# Patient Record
Sex: FEMALE | Race: WHITE | NOT HISPANIC OR LATINO | Employment: FULL TIME | URBAN - NONMETROPOLITAN AREA
[De-identification: names, ages, dates, MRNs, and addresses within clinical notes are randomized per-mention and may not be internally consistent; named-entity substitution may affect disease eponyms.]

---

## 2021-01-08 DIAGNOSIS — I10 ESSENTIAL HYPERTENSION, BENIGN: Primary | ICD-10-CM

## 2021-01-08 RX ORDER — FLUTICASONE PROPIONATE 50 MCG
50 SPRAY, SUSPENSION (ML) NASAL DAILY
COMMUNITY
Start: 2020-10-26

## 2021-01-08 RX ORDER — ROSUVASTATIN CALCIUM 10 MG/1
10 TABLET, COATED ORAL
COMMUNITY
Start: 2020-12-17 | End: 2021-06-11

## 2021-01-08 RX ORDER — AMLODIPINE AND VALSARTAN 5; 320 MG/1; MG/1
1 TABLET ORAL DAILY
COMMUNITY
Start: 2020-12-17 | End: 2021-09-13

## 2021-01-08 NOTE — TELEPHONE ENCOUNTER
Dr. Guillen:     Last Office Visit: 04/27/20  Last Labs: 04/27/2020  Next Lab Visit: N/A  Next Office Visit: 02/25/21

## 2021-01-14 RX ORDER — NEBIVOLOL 10 MG/1
10 TABLET ORAL DAILY
Qty: 90 TABLET | Refills: 3 | Status: SHIPPED | OUTPATIENT
Start: 2021-01-14 | End: 2021-12-17

## 2021-03-22 ENCOUNTER — LAB (OUTPATIENT)
Dept: CARDIOLOGY | Facility: CLINIC | Age: 61
End: 2021-03-22

## 2021-03-26 ENCOUNTER — LAB (OUTPATIENT)
Dept: CARDIOLOGY | Facility: CLINIC | Age: 61
End: 2021-03-26

## 2021-03-30 ENCOUNTER — LAB (OUTPATIENT)
Dept: CARDIOLOGY | Facility: CLINIC | Age: 61
End: 2021-03-30

## 2021-03-30 DIAGNOSIS — E78.2 MIXED HYPERLIPIDEMIA: ICD-10-CM

## 2021-03-30 DIAGNOSIS — I10 ESSENTIAL HYPERTENSION, BENIGN: Primary | ICD-10-CM

## 2021-03-31 LAB
ALBUMIN SERPL-MCNC: 4.5 G/DL (ref 3.8–4.9)
ALBUMIN/GLOB SERPL: 1.7 {RATIO} (ref 1.2–2.2)
ALP SERPL-CCNC: 99 IU/L (ref 39–117)
ALT SERPL-CCNC: 13 IU/L (ref 0–32)
AST SERPL-CCNC: 22 IU/L (ref 0–40)
BASOPHILS # BLD AUTO: 0.1 X10E3/UL (ref 0–0.2)
BASOPHILS NFR BLD AUTO: 1 %
BILIRUB SERPL-MCNC: 0.3 MG/DL (ref 0–1.2)
BUN SERPL-MCNC: 10 MG/DL (ref 6–24)
BUN/CREAT SERPL: 15 (ref 9–23)
CALCIUM SERPL-MCNC: 9.2 MG/DL (ref 8.7–10.2)
CHLORIDE SERPL-SCNC: 104 MMOL/L (ref 96–106)
CHOLEST SERPL-MCNC: 176 MG/DL (ref 100–199)
CK SERPL-CCNC: 79 U/L (ref 32–182)
CO2 SERPL-SCNC: 21 MMOL/L (ref 20–29)
CREAT SERPL-MCNC: 0.68 MG/DL (ref 0.57–1)
EOSINOPHIL # BLD AUTO: 0.3 X10E3/UL (ref 0–0.4)
EOSINOPHIL NFR BLD AUTO: 2 %
ERYTHROCYTE [DISTWIDTH] IN BLOOD BY AUTOMATED COUNT: 13.7 % (ref 11.7–15.4)
GLOBULIN SER CALC-MCNC: 2.7 G/DL (ref 1.5–4.5)
GLUCOSE SERPL-MCNC: 102 MG/DL (ref 65–99)
HCT VFR BLD AUTO: 43.4 % (ref 34–46.6)
HDLC SERPL-MCNC: 54 MG/DL
HGB BLD-MCNC: 14.2 G/DL (ref 11.1–15.9)
IMM GRANULOCYTES # BLD AUTO: 0 X10E3/UL (ref 0–0.1)
IMM GRANULOCYTES NFR BLD AUTO: 0 %
LDLC SERPL CALC-MCNC: 87 MG/DL (ref 0–99)
LYMPHOCYTES # BLD AUTO: 3.5 X10E3/UL (ref 0.7–3.1)
LYMPHOCYTES NFR BLD AUTO: 30 %
MCH RBC QN AUTO: 30.3 PG (ref 26.6–33)
MCHC RBC AUTO-ENTMCNC: 32.7 G/DL (ref 31.5–35.7)
MCV RBC AUTO: 93 FL (ref 79–97)
MONOCYTES # BLD AUTO: 0.9 X10E3/UL (ref 0.1–0.9)
MONOCYTES NFR BLD AUTO: 8 %
NEUTROPHILS # BLD AUTO: 6.8 X10E3/UL (ref 1.4–7)
NEUTROPHILS NFR BLD AUTO: 59 %
PLATELET # BLD AUTO: 264 X10E3/UL (ref 150–450)
POTASSIUM SERPL-SCNC: 4.5 MMOL/L (ref 3.5–5.2)
PROT SERPL-MCNC: 7.2 G/DL (ref 6–8.5)
RBC # BLD AUTO: 4.69 X10E6/UL (ref 3.77–5.28)
SODIUM SERPL-SCNC: 142 MMOL/L (ref 134–144)
TRIGL SERPL-MCNC: 208 MG/DL (ref 0–149)
VLDLC SERPL CALC-MCNC: 35 MG/DL (ref 5–40)
WBC # BLD AUTO: 11.6 X10E3/UL (ref 3.4–10.8)

## 2021-04-07 ENCOUNTER — OFFICE VISIT (OUTPATIENT)
Dept: CARDIOLOGY | Facility: CLINIC | Age: 61
End: 2021-04-07

## 2021-04-07 VITALS — HEIGHT: 66 IN | BODY MASS INDEX: 28.93 KG/M2 | WEIGHT: 180 LBS

## 2021-04-07 DIAGNOSIS — Z72.0 TOBACCO USE: ICD-10-CM

## 2021-04-07 DIAGNOSIS — E78.5 HYPERLIPIDEMIA LDL GOAL <100: ICD-10-CM

## 2021-04-07 DIAGNOSIS — I10 ESSENTIAL HYPERTENSION: Primary | ICD-10-CM

## 2021-04-07 PROCEDURE — 99443 PR PHYS/QHP TELEPHONE EVALUATION 21-30 MIN: CPT | Performed by: INTERNAL MEDICINE

## 2021-04-07 RX ORDER — PROMETHAZINE HYDROCHLORIDE 25 MG/1
TABLET ORAL
COMMUNITY
Start: 2021-03-20 | End: 2022-06-23 | Stop reason: ALTCHOICE

## 2021-04-07 NOTE — PROGRESS NOTES
[facility]  [facility] CARDIOLOGY  [address] [place] [address]  Dept: [phone]  Dept Fax: [phone]    Jory Taylor  1960    Televisit Note    You have chosen to receive care through a telephone visit. Do you consent to use a telephone visit for your medical care today? Yes    History of Present Illness:  Jory Taylor is a 60 y.o. female who presents via phone for Follow-up. Hypertension- The BP is fine,  On Exforge 5,320 and Bystolic 5mg     The following portions of the patient's history were reviewed and updated as appropriate: allergies, current medications, past family history, past medical history, past social history, past surgical history and problem list.    This visit was scheduled as a phone visit to comply with patient safety concerns in accordance with CDC recommendations.  Time spent in discussion with the patient was 30 minutes.     Medications  amLODIPine-valsartan  fluticasone  nebivolol  promethazine  rosuvastatin    Subjective  Allergies   Allergen Reactions   • Cefdinir Unknown - High Severity   • Levofloxacin Unknown - High Severity   • Sulfamethoxazole Unknown - High Severity   • Trimethoprim Unknown - High Severity   • Penicillins Rash        Past Medical History:   Diagnosis Date   • Acute non-recurrent maxillary sinusitis    • Anxiety    • Benign hypertension    • BMI 30.0-30.9,adult    • CAD (coronary artery disease)    • Chest pain    • Cholelithiases    • Diabetes (CMS/HCC)    • Dyslipidemia    • Hypercholesterolemia    • Irritable bowel disease    • Migraine headache    • Tobacco use        Past Surgical History:   Procedure Laterality Date   • CHOLECYSTECTOMY  1999   • TUBAL ABDOMINAL LIGATION Bilateral 2002       Family History   Problem Relation Age of Onset   • Mitral valve prolapse Mother    • Migraines Mother    • Diabetes Father    • Stroke Father    • Hypertension Father    • Coronary artery disease Father         Social  "History     Socioeconomic History   • Marital status:      Spouse name: Not on file   • Number of children: Not on file   • Years of education: Not on file   • Highest education level: Not on file   Tobacco Use   • Smoking status: Current Every Day Smoker     Packs/day: 0.50     Types: Cigarettes     Start date: 1981   • Smokeless tobacco: Never Used   Substance and Sexual Activity   • Alcohol use: Not Currently   • Drug use: Never   • Sexual activity: Defer       Cardiovascular Procedures    ECHO/MUGA:   STRESS TESTS:   CARDIAC CATH:   DEVICES:   HOLTER:   CT/MRI:   VASCULAR:   CARDIOTHORACIC:     Objective  Vitals:    04/07/21 0904   Weight: 81.6 kg (180 lb)   Height: 167.6 cm (66\")   PainSc: 0-No pain     Body mass index is 29.05 kg/m².    Assessment and Plan  Diagnoses and all orders for this visit:    Essential hypertension- BP seems fine, will keep same meds     Hyperlipidemia LDL goal <100- On Crestor 10 mg, LDL is good, try are mildly elevated , likely related to smoking     Tobacco use-she was advised to quit smoking     Other orders  -     promethazine (PHENERGAN) 25 MG tablet; 1 TABLET BY MOUTH EVERY 6 HOURS NAUSEA/VOMITING        No follow-ups on file.    Satnam Guillen MD  04/07/2021  "

## 2021-06-11 DIAGNOSIS — E78.5 HYPERLIPIDEMIA LDL GOAL <100: Primary | ICD-10-CM

## 2021-06-11 RX ORDER — ROSUVASTATIN CALCIUM 10 MG/1
TABLET, COATED ORAL
Qty: 90 TABLET | Refills: 3 | Status: SHIPPED | OUTPATIENT
Start: 2021-06-11 | End: 2022-06-23 | Stop reason: SDUPTHER

## 2021-09-12 DIAGNOSIS — I10 ESSENTIAL HYPERTENSION: Primary | ICD-10-CM

## 2021-09-13 RX ORDER — AMLODIPINE AND VALSARTAN 5; 320 MG/1; MG/1
TABLET ORAL
Qty: 30 TABLET | Refills: 0 | Status: SHIPPED | OUTPATIENT
Start: 2021-09-13 | End: 2021-09-28

## 2021-09-28 ENCOUNTER — TELEPHONE (OUTPATIENT)
Dept: CARDIOLOGY | Facility: CLINIC | Age: 61
End: 2021-09-28

## 2021-09-28 DIAGNOSIS — I10 ESSENTIAL HYPERTENSION: ICD-10-CM

## 2021-09-28 RX ORDER — AMLODIPINE AND VALSARTAN 5; 320 MG/1; MG/1
1 TABLET ORAL DAILY
Qty: 90 TABLET | Refills: 3 | Status: SHIPPED | OUTPATIENT
Start: 2021-09-28 | End: 2022-06-23 | Stop reason: SDUPTHER

## 2021-09-28 NOTE — TELEPHONE ENCOUNTER
Was was amLODIPine-valsartan (EXFORGE) 5-320 MG per tablet    Sent in for 30 days no refill    patient upset    Please call her        amLODIPine-valsartan (EXFORGE) 5-320 MG per tablet

## 2021-12-17 DIAGNOSIS — I10 ESSENTIAL HYPERTENSION, BENIGN: ICD-10-CM

## 2021-12-17 RX ORDER — NEBIVOLOL HYDROCHLORIDE 10 MG/1
TABLET ORAL
Qty: 90 TABLET | Refills: 0 | Status: SHIPPED | OUTPATIENT
Start: 2021-12-17 | End: 2022-03-14

## 2022-03-13 DIAGNOSIS — I10 ESSENTIAL HYPERTENSION, BENIGN: ICD-10-CM

## 2022-03-14 RX ORDER — NEBIVOLOL 10 MG/1
TABLET ORAL
Qty: 30 TABLET | Refills: 0 | Status: SHIPPED | OUTPATIENT
Start: 2022-03-14 | End: 2022-04-18

## 2022-04-16 DIAGNOSIS — I10 ESSENTIAL HYPERTENSION, BENIGN: ICD-10-CM

## 2022-04-18 RX ORDER — NEBIVOLOL 10 MG/1
TABLET ORAL
Qty: 30 TABLET | Refills: 0 | Status: SHIPPED | OUTPATIENT
Start: 2022-04-18 | End: 2022-05-19

## 2022-04-18 NOTE — TELEPHONE ENCOUNTER
Tried calling pt left a vm to call back, she needs an appointment with Dr. Guillen to receive refills on her meds. Enough was sent in for 1 month to get her through.

## 2022-05-19 DIAGNOSIS — I10 ESSENTIAL HYPERTENSION, BENIGN: ICD-10-CM

## 2022-05-19 RX ORDER — NEBIVOLOL 10 MG/1
TABLET ORAL
Qty: 10 TABLET | Refills: 0 | Status: SHIPPED | OUTPATIENT
Start: 2022-05-19 | End: 2022-06-23 | Stop reason: SDUPTHER

## 2022-06-19 DIAGNOSIS — E78.5 HYPERLIPIDEMIA LDL GOAL <100: ICD-10-CM

## 2022-06-20 ENCOUNTER — TELEPHONE (OUTPATIENT)
Dept: CARDIOLOGY | Facility: CLINIC | Age: 62
End: 2022-06-20

## 2022-06-20 RX ORDER — ROSUVASTATIN CALCIUM 10 MG/1
TABLET, COATED ORAL
Qty: 90 TABLET | Refills: 3 | OUTPATIENT
Start: 2022-06-20

## 2022-06-23 ENCOUNTER — OFFICE VISIT (OUTPATIENT)
Dept: CARDIOLOGY | Facility: CLINIC | Age: 62
End: 2022-06-23

## 2022-06-23 VITALS
WEIGHT: 186 LBS | SYSTOLIC BLOOD PRESSURE: 118 MMHG | TEMPERATURE: 96.8 F | DIASTOLIC BLOOD PRESSURE: 76 MMHG | RESPIRATION RATE: 18 BRPM | HEART RATE: 81 BPM | HEIGHT: 66 IN | BODY MASS INDEX: 29.89 KG/M2 | OXYGEN SATURATION: 98 %

## 2022-06-23 DIAGNOSIS — I10 ESSENTIAL HYPERTENSION: ICD-10-CM

## 2022-06-23 DIAGNOSIS — E78.5 HYPERLIPIDEMIA LDL GOAL <100: ICD-10-CM

## 2022-06-23 DIAGNOSIS — Z72.0 TOBACCO USE: Primary | ICD-10-CM

## 2022-06-23 PROCEDURE — 99214 OFFICE O/P EST MOD 30 MIN: CPT | Performed by: INTERNAL MEDICINE

## 2022-06-23 PROCEDURE — 93000 ELECTROCARDIOGRAM COMPLETE: CPT | Performed by: INTERNAL MEDICINE

## 2022-06-23 RX ORDER — ROSUVASTATIN CALCIUM 10 MG/1
10 TABLET, COATED ORAL
Qty: 90 TABLET | Refills: 3 | Status: SHIPPED | OUTPATIENT
Start: 2022-06-23 | End: 2023-06-18

## 2022-06-23 RX ORDER — AMLODIPINE AND VALSARTAN 5; 320 MG/1; MG/1
1 TABLET ORAL DAILY
Qty: 90 TABLET | Refills: 3 | Status: SHIPPED | OUTPATIENT
Start: 2022-06-23

## 2022-06-23 RX ORDER — NEBIVOLOL 10 MG/1
10 TABLET ORAL DAILY
Qty: 90 TABLET | Refills: 3 | Status: SHIPPED | OUTPATIENT
Start: 2022-06-23 | End: 2023-06-18

## 2022-06-23 NOTE — PROGRESS NOTES
[facility]  [facility] CARDIOLOGY  [address] [place] [address]  Dept: [phone]  Dept Fax: [phone]    Jory Taylor  1960    Follow Up Office Visit Note    History of Present Illness:  Jory Taylor is a 62 y.o. female who presents to the clinic for Follow-up. Hypertension- The BP is 125.80 on Exforge 5,320, and Bystolic 5 mg, , doing good, asymptomatic     The following portions of the patient's history were reviewed and updated as appropriate: allergies, current medications, past family history, past medical history, past social history, past surgical history and problem list.    Medications:  amLODIPine-valsartan  fluticasone  nebivolol  rosuvastatin    Subjective  Allergies   Allergen Reactions   • Cefdinir Unknown - High Severity   • Levofloxacin Unknown - High Severity   • Sulfamethoxazole Unknown - High Severity   • Trimethoprim Unknown - High Severity   • Penicillins Rash        Past Medical History:   Diagnosis Date   • Acute non-recurrent maxillary sinusitis    • Anxiety    • Benign hypertension    • BMI 30.0-30.9,adult    • CAD (coronary artery disease)    • Chest pain    • Cholelithiases    • Diabetes (HCC)    • Dyslipidemia    • Hypercholesterolemia    • Irritable bowel disease    • Migraine headache    • Tobacco use        Past Surgical History:   Procedure Laterality Date   • CHOLECYSTECTOMY  1999   • TUBAL ABDOMINAL LIGATION Bilateral 2002       Family History   Problem Relation Age of Onset   • Mitral valve prolapse Mother    • Migraines Mother    • Diabetes Father    • Stroke Father    • Hypertension Father    • Coronary artery disease Father         Social History     Socioeconomic History   • Marital status:    Tobacco Use   • Smoking status: Current Every Day Smoker     Packs/day: 0.50     Types: Cigarettes     Start date: 1981   • Smokeless tobacco: Never Used   Substance and Sexual Activity   • Alcohol use: Not Currently   •  "Drug use: Never   • Sexual activity: Defer       Review of Systems   Constitutional: Negative.    HENT: Negative.    Respiratory: Negative.    Cardiovascular: Negative.    Endocrine: Negative.    Genitourinary: Negative.    Musculoskeletal: Negative.    Skin: Negative.    Allergic/Immunologic: Negative.    Neurological: Negative.    Hematological: Negative.    Psychiatric/Behavioral: Negative.        Cardiovascular Procedures    ECHO/MUGA:   STRESS TESTS:   CARDIAC CATH:   DEVICES:   HOLTER:   CT/MRI:   VASCULAR:   CARDIOTHORACIC:     Objective  Vitals:    06/23/22 1304   BP: 118/76   BP Location: Left arm   Patient Position: Lying   Cuff Size: Adult   Pulse: 81   Resp: 18   Temp: 96.8 °F (36 °C)   TempSrc: Temporal   SpO2: 98%   Weight: 84.4 kg (186 lb)   Height: 167.6 cm (66\")   PainSc: 0-No pain     Body mass index is 30.02 kg/m².     Physical Exam  Constitutional:       Appearance: Healthy appearance. Not in distress.   Neck:      Vascular: No JVR. JVD normal.   Pulmonary:      Effort: Pulmonary effort is normal.      Breath sounds: Normal breath sounds. No wheezing. No rhonchi. No rales.   Chest:      Chest wall: Not tender to palpatation.   Cardiovascular:      PMI at left midclavicular line. Normal rate. Regular rhythm. Normal S1. Normal S2.      Murmurs: There is no murmur.      No gallop. No click. No rub.   Pulses:     Intact distal pulses.   Edema:     Peripheral edema absent.   Abdominal:      General: Bowel sounds are normal.      Palpations: Abdomen is soft.      Tenderness: There is no abdominal tenderness.   Musculoskeletal: Normal range of motion.         General: No tenderness. Skin:     General: Skin is warm and dry.   Neurological:      General: No focal deficit present.      Mental Status: Alert and oriented to person, place and time.          Diagnostic Data    ECG 12 Lead    Date/Time: 6/23/2022 2:34 PM  Performed by: Satnam Guillen MD  Authorized by: Satnam Guillen MD "   Comparison: compared with previous ECG from 4/27/2020  Similar to previous ECG  Rhythm: sinus rhythm  Rate: normal  BPM: 68    Clinical impression: normal ECG            Assessment and Plan  Diagnoses and all orders for this visit:    Essential hypertension- The BP is fine, will keep same meds Bytolic 10 mg and also Exforge 5,320  -     amLODIPine-valsartan (EXFORGE) 5-320 MG per tablet; Take 1 tablet by mouth Daily.    Hyperlipidemia LDL goal <100- the Lipids are cover with Crestor 10 mg, we need a Lipid profile in 2021 were fine  -     rosuvastatin (CRESTOR) 10 MG tablet; Take 1 tablet by mouth every night at bedtime for 360 days.         Return in about 1 year (around 6/23/2023) for Recheck.    Satnam Guillen MD  06/23/2022

## 2022-11-19 ENCOUNTER — OFFICE VISIT (OUTPATIENT)
Dept: FAMILY MEDICINE CLINIC | Facility: CLINIC | Age: 62
End: 2022-11-19

## 2022-11-19 VITALS
OXYGEN SATURATION: 97 % | WEIGHT: 189.06 LBS | SYSTOLIC BLOOD PRESSURE: 124 MMHG | BODY MASS INDEX: 30.39 KG/M2 | DIASTOLIC BLOOD PRESSURE: 84 MMHG | TEMPERATURE: 98.2 F | HEIGHT: 66 IN | HEART RATE: 79 BPM

## 2022-11-19 DIAGNOSIS — J01.00 ACUTE NON-RECURRENT MAXILLARY SINUSITIS: Primary | ICD-10-CM

## 2022-11-19 DIAGNOSIS — F41.9 ANXIETY: ICD-10-CM

## 2022-11-19 LAB
EXPIRATION DATE: NORMAL
EXPIRATION DATE: NORMAL
FLUAV AG NPH QL: NEGATIVE
FLUBV AG NPH QL: NEGATIVE
INTERNAL CONTROL: NORMAL
INTERNAL CONTROL: NORMAL
Lab: NORMAL
Lab: NORMAL
SARS-COV-2 AG UPPER RESP QL IA.RAPID: NOT DETECTED

## 2022-11-19 PROCEDURE — 99214 OFFICE O/P EST MOD 30 MIN: CPT | Performed by: NURSE PRACTITIONER

## 2022-11-19 PROCEDURE — 87804 INFLUENZA ASSAY W/OPTIC: CPT | Performed by: NURSE PRACTITIONER

## 2022-11-19 PROCEDURE — 87426 SARSCOV CORONAVIRUS AG IA: CPT | Performed by: NURSE PRACTITIONER

## 2022-11-19 RX ORDER — DOXYCYCLINE HYCLATE 100 MG/1
100 CAPSULE ORAL 2 TIMES DAILY
Qty: 20 CAPSULE | Refills: 0 | Status: SHIPPED | OUTPATIENT
Start: 2022-11-19 | End: 2022-12-05

## 2022-11-19 RX ORDER — ESCITALOPRAM OXALATE 10 MG/1
TABLET ORAL
Qty: 30 TABLET | OUTPATIENT
Start: 2022-11-19

## 2022-11-19 RX ORDER — ESCITALOPRAM OXALATE 10 MG/1
5 TABLET ORAL DAILY
Qty: 15 TABLET | Refills: 1 | Status: SHIPPED | OUTPATIENT
Start: 2022-11-19

## 2022-11-19 RX ORDER — ESCITALOPRAM OXALATE 10 MG/1
10 TABLET ORAL DAILY
COMMUNITY
End: 2022-11-19 | Stop reason: SDUPTHER

## 2022-11-19 NOTE — ASSESSMENT & PLAN NOTE
We will start back on 5 mg Lexapro.  PHQ-9 completed and discussed.  No thoughts of suicide or hurting herself or anyone else.  Risk of meds discussed understood.  Follow-up 1 month, sooner if needed.  Return to clinic or ED with any issues or concerns.

## 2022-11-19 NOTE — PROGRESS NOTES
"Chief Complaint  Sore Throat    Subjective          Jory aTylor presents to [facility]  History of Present Illness     Patient presents with concerns of cough congestion and fever for the past week.  No sick contacts that she is aware of.  No shortness of breath no trouble breathing no GI issues.  States occasional mild cough but mainly issue is in her sinuses.    She also states she has a lot going on in her life if she wants to get started back on her 5 mg Lexapro to help with the stress in her nerves.  States she is taking in the past and did well on 5 mg.  No thoughts of suicide or hurting herself or anyone else.    Objective   Vital Signs:   /84   Pulse 79   Temp 98.2 °F (36.8 °C)   Ht 167.6 cm (66\")   Wt 85.8 kg (189 lb 1 oz)   SpO2 97%   BMI 30.52 kg/m²     Body mass index is 30.52 kg/m².    Review of Systems   Constitutional: Negative for chills and fatigue.   HENT: Positive for congestion and sinus pressure. Negative for rhinorrhea, sneezing, sore throat, swollen glands and trouble swallowing.    Respiratory: Positive for cough. Negative for shortness of breath and wheezing.    Cardiovascular: Negative for chest pain.   Gastrointestinal: Negative for abdominal pain, diarrhea, nausea and vomiting.   Genitourinary: Negative for dysuria and frequency.   Musculoskeletal: Negative for back pain.   Neurological: Negative for headache.       Past History:  Medical History: has a past medical history of Acute non-recurrent maxillary sinusitis, Anxiety, Benign hypertension, BMI 30.0-30.9,adult, CAD (coronary artery disease), Chest pain, Cholelithiases, Diabetes (HCC), Dyslipidemia, Hypercholesterolemia, Irritable bowel disease, Migraine headache, and Tobacco use.   Surgical History: has a past surgical history that includes Cholecystectomy (1999) and Tubal ligation (Bilateral, 2002).   Family History: family history includes Coronary artery disease in her father; " Diabetes in her father; Hypertension in her father; Migraines in her mother; Mitral valve prolapse in her mother; Stroke in her father.   Social History: reports that she has been smoking cigarettes. She started smoking about 41 years ago. She has been smoking an average of .5 packs per day. She has never used smokeless tobacco. She reports that she does not currently use alcohol. She reports that she does not use drugs.    PHQ-2 Depression Screening  Little interest or pleasure in doing things? 0-->not at all   Feeling down, depressed, or hopeless? 1-->several days   PHQ-2 Total Score 1        PHQ-9 Depression Screening  Little interest or pleasure in doing things? 0-->not at all   Feeling down, depressed, or hopeless? 1-->several days   Trouble falling or staying asleep, or sleeping too much?     Feeling tired or having little energy?     Poor appetite or overeating?     Feeling bad about yourself - or that you are a failure or have let yourself or your family down?     Trouble concentrating on things, such as reading the newspaper or watching television?     Moving or speaking so slowly that other people could have noticed? Or the opposite - being so fidgety or restless that you have been moving around a lot more than usual?     Thoughts that you would be better off dead, or of hurting yourself in some way?     PHQ-9 Total Score 1   If you checked off any problems, how difficult have these problems made it for you to do your work, take care of things at home, or get along with other people?       PHQ-9 Total Score: 1      Patient screened positive for depression based on a PHQ-9 score of  on . Follow-up recommendations include:          Current Outpatient Medications:   •  amLODIPine-valsartan (EXFORGE) 5-320 MG per tablet, Take 1 tablet by mouth Daily., Disp: 90 tablet, Rfl: 3  •  escitalopram (LEXAPRO) 10 MG tablet, Take 0.5 tablets by mouth Daily., Disp: 15 tablet, Rfl: 1  •  fluticasone (FLONASE) 50 MCG/ACT  nasal spray, 50 sprays into the nostril(s) as directed by provider Daily., Disp: , Rfl:   •  nebivolol (BYSTOLIC) 10 MG tablet, Take 1 tablet by mouth Daily for 360 days., Disp: 90 tablet, Rfl: 3  •  rosuvastatin (CRESTOR) 10 MG tablet, Take 1 tablet by mouth every night at bedtime for 360 days., Disp: 90 tablet, Rfl: 3  •  doxycycline (VIBRAMYCIN) 100 MG capsule, Take 1 capsule by mouth 2 (Two) Times a Day., Disp: 20 capsule, Rfl: 0   (Not in a hospital admission)     Allergies: Cefdinir, Levofloxacin, Sulfamethoxazole, Trimethoprim, and Penicillins    Physical Exam  Constitutional:       Appearance: Normal appearance.   HENT:      Right Ear: Tympanic membrane, ear canal and external ear normal.      Left Ear: Tympanic membrane, ear canal and external ear normal.      Nose: Congestion present.      Comments: Maxillary sinuses tender bilaterally      Mouth/Throat:      Mouth: Mucous membranes are moist.      Pharynx: Oropharynx is clear.   Cardiovascular:      Rate and Rhythm: Normal rate and regular rhythm.      Heart sounds: Normal heart sounds.   Pulmonary:      Effort: Pulmonary effort is normal.      Breath sounds: Normal breath sounds.   Neurological:      General: No focal deficit present.      Mental Status: She is alert and oriented to person, place, and time. Mental status is at baseline.   Psychiatric:         Attention and Perception: Attention and perception normal.         Mood and Affect: Mood and affect normal.         Speech: Speech normal.         Behavior: Behavior normal. Behavior is cooperative.         Thought Content: Thought content normal. Thought content does not include homicidal or suicidal ideation. Thought content does not include homicidal or suicidal plan.         Cognition and Memory: Cognition and memory normal.         Judgment: Judgment normal.          Result Review :                   Assessment and Plan    Diagnoses and all orders for this visit:    1. Acute non-recurrent  maxillary sinusitis (Primary)  Assessment & Plan:  Flu and COVID-negative today in clinic.  Antibiotic as prescribed.  Tylenol as needed for pain fever.  Mucinex DM as needed cough congestion.  Fluids and rest encouraged.  Risk of meds discussed understood.  Return in 2 days if no improvement, sooner if worsens.  Return to clinic or ED with any issues or concerns.    Orders:  -     doxycycline (VIBRAMYCIN) 100 MG capsule; Take 1 capsule by mouth 2 (Two) Times a Day.  Dispense: 20 capsule; Refill: 0  -     Covid-19 + Flu A&B AG, Veritor; Future  -     POCT Influenza A/B  -     POCT SARS-CoV-2 Antigen NABIL    2. Anxiety  Assessment & Plan:  We will start back on 5 mg Lexapro.  PHQ-9 completed and discussed.  No thoughts of suicide or hurting herself or anyone else.  Risk of meds discussed understood.  Follow-up 1 month, sooner if needed.  Return to clinic or ED with any issues or concerns.    Orders:  -     escitalopram (LEXAPRO) 10 MG tablet; Take 0.5 tablets by mouth Daily.  Dispense: 15 tablet; Refill: 1            BMI is >= 30 and <35. (Class 1 Obesity). The following options were offered after discussion;: exercise counseling/recommendations and nutrition counseling/recommendations       Follow Up   Return in about 4 weeks (around 12/17/2022), or if symptoms worsen or fail to improve.  Patient was given instructions and counseling regarding her condition or for health maintenance advice. Please see specific information pulled into the AVS if appropriate.     TOMASA Bethea

## 2022-11-19 NOTE — ASSESSMENT & PLAN NOTE
Flu and COVID-negative today in clinic.  Antibiotic as prescribed.  Tylenol as needed for pain fever.  Mucinex DM as needed cough congestion.  Fluids and rest encouraged.  Risk of meds discussed understood.  Return in 2 days if no improvement, sooner if worsens.  Return to clinic or ED with any issues or concerns.

## 2022-12-01 DIAGNOSIS — J01.00 ACUTE NON-RECURRENT MAXILLARY SINUSITIS: ICD-10-CM

## 2022-12-01 RX ORDER — DOXYCYCLINE HYCLATE 100 MG/1
100 CAPSULE ORAL 2 TIMES DAILY
Qty: 20 CAPSULE | Refills: 0 | Status: CANCELLED | OUTPATIENT
Start: 2022-12-01

## 2022-12-01 NOTE — TELEPHONE ENCOUNTER
Did it clear up at all from last visit? We are limited on antibiotics due to her allergies. I cant remember, can she tolerate steroids like prednisone? May need to add something like that too if not clearing up but let me know. But if it is starting to clear up may just need a few more days of the doxy.

## 2022-12-01 NOTE — TELEPHONE ENCOUNTER
Caller: Jory Taylor    Relationship: Self    Best call back number: [phone]    Requested Prescriptions:   Requested Prescriptions     Pending Prescriptions Disp Refills   • doxycycline (VIBRAMYCIN) 100 MG capsule 20 capsule 0     Sig: Take 1 capsule by mouth 2 (Two) Times a Day.        Pharmacy where request should be sent: [facility]Y #[id number] [place] [id number] [address] [phone]  - [phone] FX     Additional details provided by patient: PATIENT IS HAVING SINUS PRESSURE, CONGESTION, RIGHT EAR PAIN, SORE THROAT     Does the patient have less than a 3 day supply:  [x] Yes  [] No      Dorene Hawkins Rep   12/01/22 09:16 EST

## 2022-12-05 RX ORDER — DOXYCYCLINE HYCLATE 100 MG/1
100 CAPSULE ORAL 2 TIMES DAILY
Qty: 14 CAPSULE | Refills: 0 | Status: SHIPPED | OUTPATIENT
Start: 2022-12-05

## 2022-12-05 RX ORDER — PREDNISONE 20 MG/1
40 TABLET ORAL DAILY
Qty: 10 TABLET | Refills: 0 | Status: SHIPPED | OUTPATIENT
Start: 2022-12-05

## 2023-06-18 DIAGNOSIS — E78.5 HYPERLIPIDEMIA LDL GOAL <100: ICD-10-CM

## 2023-06-18 DIAGNOSIS — I10 ESSENTIAL HYPERTENSION: ICD-10-CM

## 2023-06-19 RX ORDER — ROSUVASTATIN CALCIUM 10 MG/1
TABLET, COATED ORAL
Qty: 30 TABLET | Refills: 0 | Status: SHIPPED | OUTPATIENT
Start: 2023-06-19

## 2023-06-19 RX ORDER — NEBIVOLOL 10 MG/1
TABLET ORAL
Qty: 30 TABLET | Refills: 0 | Status: SHIPPED | OUTPATIENT
Start: 2023-06-19 | End: 2023-06-26 | Stop reason: SDUPTHER

## 2023-08-09 DIAGNOSIS — E78.5 HYPERLIPIDEMIA LDL GOAL <100: ICD-10-CM

## 2023-08-09 RX ORDER — ROSUVASTATIN CALCIUM 10 MG/1
TABLET, COATED ORAL
Qty: 90 TABLET | Refills: 1 | Status: SHIPPED | OUTPATIENT
Start: 2023-08-09

## 2024-02-15 DIAGNOSIS — E78.5 HYPERLIPIDEMIA LDL GOAL <100: ICD-10-CM

## 2024-02-15 RX ORDER — ROSUVASTATIN CALCIUM 10 MG/1
TABLET, COATED ORAL
Qty: 90 TABLET | Refills: 1 | Status: SHIPPED | OUTPATIENT
Start: 2024-02-15

## 2024-06-25 ENCOUNTER — OFFICE VISIT (OUTPATIENT)
Dept: FAMILY MEDICINE CLINIC | Facility: CLINIC | Age: 64
End: 2024-06-25
Payer: COMMERCIAL

## 2024-06-25 VITALS
WEIGHT: 175.19 LBS | HEART RATE: 54 BPM | BODY MASS INDEX: 28.15 KG/M2 | HEIGHT: 66 IN | SYSTOLIC BLOOD PRESSURE: 140 MMHG | DIASTOLIC BLOOD PRESSURE: 92 MMHG | OXYGEN SATURATION: 99 %

## 2024-06-25 DIAGNOSIS — Z12.11 SCREENING FOR COLON CANCER: ICD-10-CM

## 2024-06-25 DIAGNOSIS — Z00.00 ANNUAL PHYSICAL EXAM: Primary | ICD-10-CM

## 2024-06-25 DIAGNOSIS — I10 ESSENTIAL HYPERTENSION: ICD-10-CM

## 2024-06-25 DIAGNOSIS — Z12.31 ENCOUNTER FOR SCREENING MAMMOGRAM FOR MALIGNANT NEOPLASM OF BREAST: ICD-10-CM

## 2024-06-25 DIAGNOSIS — Z12.4 SCREENING FOR CERVICAL CANCER: ICD-10-CM

## 2024-06-25 DIAGNOSIS — Z72.0 TOBACCO USE: ICD-10-CM

## 2024-06-25 DIAGNOSIS — E78.5 HYPERLIPIDEMIA LDL GOAL <100: ICD-10-CM

## 2024-06-25 DIAGNOSIS — R53.83 OTHER FATIGUE: ICD-10-CM

## 2024-06-25 DIAGNOSIS — E11.9 TYPE 2 DIABETES MELLITUS WITHOUT COMPLICATION, WITH LONG-TERM CURRENT USE OF INSULIN: ICD-10-CM

## 2024-06-25 DIAGNOSIS — Z11.59 NEED FOR HEPATITIS C SCREENING TEST: ICD-10-CM

## 2024-06-25 DIAGNOSIS — F41.9 ANXIETY: ICD-10-CM

## 2024-06-25 DIAGNOSIS — Z79.4 TYPE 2 DIABETES MELLITUS WITHOUT COMPLICATION, WITH LONG-TERM CURRENT USE OF INSULIN: ICD-10-CM

## 2024-06-25 DIAGNOSIS — Z12.2 SCREENING FOR LUNG CANCER: ICD-10-CM

## 2024-06-25 PROBLEM — Z12.39 SCREENING FOR BREAST CANCER: Status: ACTIVE | Noted: 2024-06-25

## 2024-06-25 PROBLEM — J01.00 ACUTE NON-RECURRENT MAXILLARY SINUSITIS: Status: RESOLVED | Noted: 2022-11-19 | Resolved: 2024-06-25

## 2024-06-25 PROCEDURE — 99214 OFFICE O/P EST MOD 30 MIN: CPT | Performed by: NURSE PRACTITIONER

## 2024-06-25 PROCEDURE — 99396 PREV VISIT EST AGE 40-64: CPT | Performed by: NURSE PRACTITIONER

## 2024-06-25 RX ORDER — ESCITALOPRAM OXALATE 10 MG/1
10 TABLET ORAL DAILY
COMMUNITY
End: 2024-06-25

## 2024-06-25 RX ORDER — BUSPIRONE HYDROCHLORIDE 5 MG/1
5 TABLET ORAL 2 TIMES DAILY
Qty: 60 TABLET | Refills: 1 | Status: SHIPPED | OUTPATIENT
Start: 2024-06-25

## 2024-06-25 NOTE — ASSESSMENT & PLAN NOTE
Labs drawn.  UA completed.  Patient denies sleep study stating she does not have an issue with snoring.  Proper diet and exercise plan discussed and encouraged.  Education provided.  Return to clinic or ED with any issues or concerns.

## 2024-06-25 NOTE — PATIENT INSTRUCTIONS
Obesity, Adult  Obesity is the condition of having too much total body fat. Being overweight or obese means that your weight is greater than what is considered healthy for your body size. Obesity is determined by a measurement called BMI (body mass index). BMI is an estimate of body fat and is calculated from height and weight. For adults, a BMI of 30 or higher is considered obese.  Obesity can lead to other health concerns and major illnesses, including:  Stroke.  Coronary artery disease (CAD).  Type 2 diabetes.  Some types of cancer, including cancers of the colon, breast, uterus, and gallbladder.  High blood pressure (hypertension).  High cholesterol.  Gallbladder stones.  Obesity can also contribute to:  Osteoarthritis.  Sleep apnea.  Infertility problems.  What are the causes?  Common causes of this condition include:  Eating daily meals that are high in calories, sugar, and fat.  Drinking high amounts of sugar-sweetened beverages, such as soft drinks.  Being born with genes that may make you more likely to become obese.  Having a medical condition that causes obesity, including:  Hypothyroidism.  Polycystic ovarian syndrome (PCOS).  Binge-eating disorder.  Cushing syndrome.  Taking certain medicines, such as steroids, antidepressants, and seizure medicines.  Not being physically active (sedentary lifestyle).  Not getting enough sleep.  What increases the risk?  The following factors may make you more likely to develop this condition:  Having a family history of obesity.  Living in an area with limited access to:  [place], recreation centers, or sidewalks.  Healthy food choices, such as grocery stores and [email or web address] markets.  What are the signs or symptoms?  The main sign of this condition is having too much body fat.  How is this diagnosed?  This condition is diagnosed based on:  Your BMI. If you are an adult with a BMI of 30 or higher, you are considered obese.  Your waist circumference. This measures the  distance around your waistline.  Your skinfold thickness. Your health care provider may gently pinch a fold of your skin and measure it.  You may have other tests to check for underlying conditions.  How is this treated?  Treatment for this condition often includes changing your lifestyle. Treatment may include some or all of the following:  Dietary changes. This may include developing a healthy meal plan.  Regular physical activity. This may include activity that causes your heart to beat faster (aerobic exercise) and strength training. Work with your health care provider to design an exercise program that works for you.  Medicine to help you lose weight if you are unable to lose one pound a week after six weeks of healthy eating and more physical activity.  Treating conditions that cause the obesity (underlying conditions).  Surgery. Surgical options may include gastric banding and gastric bypass. Surgery may be done if:  Other treatments have not helped to improve your condition.  You have a BMI of 40 or higher.  You have life-threatening health problems related to obesity.  Follow these instructions at home:  Eating and drinking    Follow recommendations from your health care provider about what you eat and drink. Your health care provider may advise you to:  Limit fast food, sweets, and processed snack foods.  Choose low-fat options, such as low-fat milk instead of whole milk.  Eat five or more servings of fruits or vegetables every day.  Choose healthy foods when you eat out.  Keep low-fat snacks available.  Limit sugary drinks, such as soda, fruit juice, sweetened iced tea, and flavored milk.  Drink enough water to keep your urine pale yellow.  Do not follow a fad diet. Fad diets can be unhealthy and even dangerous.  Other healthful choices include:  Eat at home more often. This gives you more control over what you eat.  Learn to read food labels. This will help you understand how much food is considered one  serving.  Learn what a healthy serving size is.  Physical activity  Exercise regularly, as told by your health care provider.  Most adults should get up to 150 minutes of moderate-intensity exercise every week.  Ask your health care provider what types of exercise are safe for you and how often you should exercise.  Warm up and stretch before being active.  Cool down and stretch after being active.  Rest between periods of activity.  Lifestyle  Work with your health care provider and a dietitian to set a weight-loss goal that is healthy and reasonable for you.  Limit your screen time.  Find ways to reward yourself that do not involve food.  Do not drink alcohol if:  Your health care provider tells you not to drink.  You are pregnant, may be pregnant, or are planning to become pregnant.  If you drink alcohol:  Limit how much you have to:  0-1 drink a day for women.  0-2 drinks a day for men.  Know how much alcohol is in your drink. In the [place] one drink equals one 12 oz bottle of beer (355 mL), one 5 oz glass of wine (148 mL), or one 1½ oz glass of hard liquor (44 mL).  General instructions  Keep a weight-loss journal to keep track of the food you eat and how much exercise you get.  Take over-the-counter and prescription medicines only as told by your health care provider.  Take vitamins and supplements only as told by your health care provider.  Consider joining a support group. Your health care provider may be able to recommend a support group.  Pay attention to your mental health as obesity can lead to depression or self esteem issues.  Keep all follow-up visits. This is important.  Contact a health care provider if:  You are unable to meet your weight-loss goal after six weeks of dietary and lifestyle changes.  You have trouble breathing.  Summary  Obesity is the condition of having too much total body fat.  Being overweight or obese means that your weight is greater than what is considered healthy for your body  size.  Work with your health care provider and a dietitian to set a weight-loss goal that is healthy and reasonable for you.  Exercise regularly, as told by your health care provider. Ask your health care provider what types of exercise are safe for you and how often you should exercise.  This information is not intended to replace advice given to you by your health care provider. Make sure you discuss any questions you have with your health care provider.  Document Revised: 07/26/2022 Document Reviewed: 07/26/2022  [employer] Patient Education © 2024 [employer]    Exercising to Lose Weight  Getting regular exercise is important for everyone. It is especially important if you are overweight. Being overweight increases your risk of heart disease, stroke, diabetes, high blood pressure, and several types of cancer. Exercising, and reducing the calories you consume, can help you lose weight and improve fitness and health.  Exercise can be moderate or vigorous intensity. To lose weight, most people need to do a certain amount of moderate or vigorous-intensity exercise each week.  How can exercise affect me?  You lose weight when you exercise enough to burn more calories than you eat. Exercise also reduces body fat and builds muscle. The more muscle you have, the more calories you burn. Exercise also:  Improves mood.  Reduces stress and tension.  Improves your overall fitness, flexibility, and endurance.  Increases bone strength.  Moderate-intensity exercise    Moderate-intensity exercise is any activity that gets you moving enough to burn at least three times more energy (calories) than if you were sitting.  Examples of moderate exercise include:  Walking a mile in 15 minutes.  Doing light yard work.  Biking at an easy pace.  Most people should get at least 150 minutes of moderate-intensity exercise a week to maintain their body weight.  Vigorous-intensity exercise  Vigorous-intensity exercise is any activity that gets  you moving enough to burn at least six times more calories than if you were sitting. When you exercise at this intensity, you should be working hard enough that you are not able to carry on a conversation.  Examples of vigorous exercise include:  Running.  Playing a team sport, such as football, basketball, and soccer.  Jumping rope.  Most people should get at least 75 minutes a week of vigorous exercise to maintain their body weight.  What actions can I take to lose weight?  The amount of exercise you need to lose weight depends on:  Your age.  The type of exercise.  Any health conditions you have.  Your overall physical ability.  Talk to your health care provider about how much exercise you need and what types of activities are safe for you.  Nutrition    Make changes to your diet as told by your health care provider or diet and nutrition specialist (dietitian). This may include:  Eating fewer calories.  Eating more protein.  Eating less unhealthy fats.  Eating a diet that includes fresh fruits and vegetables, whole grains, low-fat dairy products, and lean protein.  Avoiding foods with added fat, salt, and sugar.  Drink plenty of water while you exercise to prevent dehydration or heat stroke.  Activity  Choose an activity that you enjoy and set realistic goals. Your health care provider can help you make an exercise plan that works for you.  Exercise at a moderate or vigorous intensity most days of the week.  The intensity of exercise may vary from person to person. You can tell how intense a workout is for you by paying attention to your breathing and heartbeat. Most people will notice their breathing and heartbeat get faster with more intense exercise.  Do resistance training twice each week, such as:  Push-ups.  Sit-ups.  Lifting weights.  Using resistance bands.  Getting short amounts of exercise can be just as helpful as long, structured periods of exercise. If you have trouble finding time to exercise, try  doing these things as part of your daily routine:  Get up, stretch, and walk around every 30 minutes throughout the day.  Go for a walk during your lunch break.  Park your car farther away from your destination.  If you take public transportation, get off one stop early and walk the rest of the way.  Make phone calls while standing up and walking around.  Take the stairs instead of elevators or escalators.  Wear comfortable clothes and shoes with good support.  Do not exercise so much that you hurt yourself, feel dizzy, or get very short of breath.  Where to find more information  [place] Department of Health and Human Services: www.hhs.gov  Centers for Disease Control and Prevention: www.cdc.gov  Contact a health care provider:  Before starting a new exercise program.  If you have questions or concerns about your weight.  If you have a medical problem that keeps you from exercising.  Get help right away if:  You have any of the following while exercising:  Injury.  Dizziness.  Difficulty breathing or shortness of breath that does not go away when you stop exercising.  Chest pain.  Rapid heartbeat.  These symptoms may represent a serious problem that is an emergency. Do not wait to see if the symptoms will go away. Get medical help right away. Call your local emergency services (911 in the [place] Do not drive yourself to the hospital.  Summary  Getting regular exercise is especially important if you are overweight.  Being overweight increases your risk of heart disease, stroke, diabetes, high blood pressure, and several types of cancer.  Losing weight happens when you burn more calories than you eat.  Reducing the amount of calories you eat, and getting regular moderate or vigorous exercise each week, helps you lose weight.  This information is not intended to replace advice given to you by your health care provider. Make sure you discuss any questions you have with your health care provider.  Document Revised:  02/13/2022 Document Reviewed: 02/13/2022  [employer] Patient Education © 2024 [employer]    MyPlate from [employer]    MyPlate is an outline of a general healthy diet based on the Dietary Guidelines for Americans, 1328-4613, from the [place] Department of Agriculture (USDA). It sets guidelines for how much food you should eat from each food group based on your age, sex, and level of physical activity.  What are tips for following MyPlate?  To follow MyPlate recommendations:  Eat a wide variety of fruits and vegetables, grains, and protein foods.  Serve smaller portions and eat less food throughout the day.  Limit portion sizes to avoid overeating.  Enjoy your food.  Get at least 150 minutes of exercise every week. This is about 30 minutes each day, 5 or more days per week.  It can be difficult to have every meal look like MyPlate. Think about MyPlate as eating guidelines for an entire day, rather than each individual meal.  Fruits and vegetables  Make one half of your plate fruits and vegetables.  Eat many different colors of fruits and vegetables each day.  For a 2,000-calorie daily food plan, eat:  2½ cups of vegetables every day.  2 cups of fruit every day.  1 cup is equal to:  1 cup raw or cooked vegetables.  1 cup raw fruit.  1 medium-sized orange, apple, or banana.  1 cup 100% fruit or vegetable juice.  2 cups raw leafy greens, such as lettuce, spinach, or kale.  ½ cup dried fruit.  Grains  One fourth of your plate should be grains.  Make at least half of the grains you eat each day whole grains.  For a 2,000-calorie daily food plan, eat 6 oz of grains every day.  1 oz is equal to:  1 slice bread.  1 cup cereal.  ½ cup cooked rice, cereal, or pasta.  Protein  One fourth of your plate should be protein.  Eat a wide variety of protein foods, including meat, poultry, fish, eggs, beans, nuts, and tofu.  For a 2,000-calorie daily food plan, eat 5½ oz of protein every day.  1 oz is equal to:  1 oz meat, poultry, or fish.  ¼  cup cooked beans.  1 egg.  ½ oz nuts or seeds.  1 Tbsp peanut butter.  Dairy  Drink fat-free or low-fat (1%) milk.  Eat or drink dairy as a side to meals.  For a 2,000-calorie daily food plan, eat or drink 3 cups of dairy every day.  1 cup is equal to:  1 cup milk, yogurt, cottage cheese, or soy milk (soy beverage).  2 oz processed cheese.  1½ oz natural cheese.  Fats, oils, salt, and sugars  Only small amounts of oils are recommended.  Avoid foods that are high in calories and low in nutritional value (empty calories), like foods high in fat or added sugars.  Choose foods that are low in salt (sodium). Choose foods that have less than 140 milligrams (mg) of sodium per serving.  Drink water instead of sugary drinks. Drink enough fluid to keep your urine pale yellow.  Where to find support  Work with your health care provider or a dietitian to develop a customized eating plan that is right for you.  Download an [employer] (mobile application) to help you track your daily food intake.  Where to find more information  USDA: ChooseMyPlate.gov  Summary  MyPlate is a general guideline for healthy eating from the USDA. It is based on the Dietary Guidelines for Americans, 3964-8190.  In general, fruits and vegetables should take up one half of your plate, grains should take up one fourth of your plate, and protein should take up one fourth of your plate.  This information is not intended to replace advice given to you by your health care provider. Make sure you discuss any questions you have with your health care provider.  Document Revised: 11/08/2021 Document Reviewed: 11/08/2021  [employer] Patient Education © 2024 [employer]

## 2024-06-25 NOTE — PROGRESS NOTES
Chief Complaint  Anxiety, Hypertension, and Diabetes    Subjective          Jory Taylor presents to [facility] for preventative yearly exam.   Anxiety   Symptoms include nervous/anxious behavior.  Patient reports no decreased concentration, depressed mood or suicidal ideas.   Hypertension  Associated symptoms include anxiety.   Diabetes  Hypoglycemia symptoms include nervousness/anxiousness. Associated symptoms include fatigue. Pertinent negatives for diabetes include no polydipsia, no polyphagia, no polyuria and no weight loss.       Patient presents for annual exam.  Is fasting.  Continues to smoke less than a pack a day.  No alcohol use.  Past history of hypertension and hyperlipidemia and doing well on medications and continues to follow-up with her cardiologist Dr. Guillen and actually has an appointment with him tomorrow.  States over the past year she has been eating better and exercising and has lost weight.  She does have history of type 2 diabetes but states she has been able to control it through diet and exercise.  States feet are fine with no cuts or sores.  No dizziness no headache no chest pain no chest pressure no shortness of breath no trouble breathing no urinary or bowel issues.    She is due a mammogram.  She is due a Pap smear.  She is due a colonoscopy.  She is due a low-dose lung CT scan.    She states she has had a tetanus vaccine within the past 10 years.  She denies all other immunizations.    States for a while, unable to give an exact time she has had episodes of recurring anxiety and stress.  States there is stress with work and with her mother.  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  She states it is not bad enough where she feels like she needs to take something every day but is wondering if there would be something that was a little more effective for her other than Lexapro.  States she only takes Lexapro as needed but  "states it does seem to help when she does takes it.  No thoughts of suicide or hurting herself or anyone else.  States no depression.    States she is felt fatigued and rundown for a while as well.  Unsure if it is just related to her stress level.  Would like to have some blood work completed.  States she does snore occasionally but states that is due to her past history of multiple nose breaks.    Objective   Vital Signs:   /92   Pulse 54   Ht 167.6 cm (66\")   Wt 79.5 kg (175 lb 3 oz)   SpO2 99%   BMI 28.28 kg/m²     Body mass index is 28.28 kg/m².    Predictive Model Details   No score data available for Risk of Fall        PHQ-9 Depression Screening  Little interest or pleasure in doing things? 0-->not at all   Feeling down, depressed, or hopeless? 0-->not at all   Trouble falling or staying asleep, or sleeping too much?     Feeling tired or having little energy?     Poor appetite or overeating?     Feeling bad about yourself - or that you are a failure or have let yourself or your family down?     Trouble concentrating on things, such as reading the newspaper or watching television?     Moving or speaking so slowly that other people could have noticed? Or the opposite - being so fidgety or restless that you have been moving around a lot more than usual?     Thoughts that you would be better off dead, or of hurting yourself in some way?     PHQ-9 Total Score 0   If you checked off any problems, how difficult have these problems made it for you to do your work, take care of things at home, or get along with other people?       Patient screened positive for depression based on a PHQ-9 score of 0 on 6/25/2024. Follow-up recommendations include:        Immunization History   Administered Date(s) Administered    Fluzone (or Fluarix & Flulaval for VFC) >6mos 11/07/2018    Influenza Quad Vaccine (Inpatient) 10/08/2020       Review of Systems   Constitutional:  Positive for fatigue. Negative for chills, " fever, unexpected weight gain and unexpected weight loss.   HENT: Negative.     Eyes: Negative.    Respiratory: Negative.     Cardiovascular: Negative.    Gastrointestinal: Negative.    Endocrine: Negative for polydipsia, polyphagia and polyuria.   Genitourinary: Negative.    Musculoskeletal: Negative.    Skin: Negative.    Neurological: Negative.    Psychiatric/Behavioral:  Positive for stress. Negative for agitation, behavioral problems, decreased concentration, dysphoric mood, hallucinations, self-injury, sleep disturbance, suicidal ideas, negative for hyperactivity and depressed mood. The patient is nervous/anxious.        Past History:  Medical History: has a past medical history of Acute non-recurrent maxillary sinusitis, Anxiety, Benign hypertension, BMI 30.0-30.9,adult, CAD (coronary artery disease), Chest pain, Cholelithiases, Diabetes, Dyslipidemia, Hypercholesterolemia, Irritable bowel disease, Migraine headache, and Tobacco use.   Surgical History: has a past surgical history that includes Cholecystectomy (1999) and Tubal ligation (Bilateral, 2002).   Family History: family history includes Coronary artery disease in her father; Diabetes in her father; Hypertension in her father; Migraines in her mother; Mitral valve prolapse in her mother; Stroke in her father.   Social History: reports that she has been smoking cigarettes. She started smoking about 43 years ago. She has a 21.7 pack-year smoking history. She has never used smokeless tobacco. She reports that she does not currently use alcohol. She reports that she does not use drugs.      Current Outpatient Medications:     amLODIPine-valsartan (EXFORGE) 5-320 MG per tablet, Take 1 tablet by mouth Daily., Disp: 90 tablet, Rfl: 3    nebivolol (BYSTOLIC) 10 MG tablet, Take 1 tablet by mouth Daily., Disp: 90 tablet, Rfl: 3    rosuvastatin (CRESTOR) 10 MG tablet, TAKE 1 TABLET BY MOUTH EVERYDAY AT BEDTIME, Disp: 90 tablet, Rfl: 1    busPIRone (BUSPAR) 5 MG  tablet, Take 1 tablet by mouth 2 (Two) Times a Day., Disp: 60 tablet, Rfl: 1   Allergies: Cefdinir, Levofloxacin, Sulfamethoxazole, Trimethoprim, and Penicillins    Physical Exam  Constitutional:       Appearance: Normal appearance.   HENT:      Head: Normocephalic.      Right Ear: Tympanic membrane, ear canal and external ear normal.      Left Ear: Tympanic membrane, ear canal and external ear normal.      Nose: Nose normal.      Mouth/Throat:      Mouth: Mucous membranes are moist.      Pharynx: Oropharynx is clear.   Eyes:      Extraocular Movements: Extraocular movements intact.      Conjunctiva/sclera: Conjunctivae normal.      Pupils: Pupils are equal, round, and reactive to light.   Cardiovascular:      Rate and Rhythm: Normal rate and regular rhythm.      Heart sounds: Normal heart sounds.   Pulmonary:      Effort: Pulmonary effort is normal.      Breath sounds: Normal breath sounds.   Abdominal:      General: Abdomen is flat. Bowel sounds are normal.      Palpations: Abdomen is soft.   Genitourinary:     Comments: Denied   Musculoskeletal:         General: Normal range of motion.      Cervical back: Normal range of motion.   Skin:     General: Skin is warm.   Neurological:      General: No focal deficit present.      Mental Status: She is alert and oriented to person, place, and time. Mental status is at baseline.   Psychiatric:         Attention and Perception: Attention and perception normal.         Mood and Affect: Mood and affect normal.         Speech: Speech normal.         Behavior: Behavior normal. Behavior is cooperative.         Thought Content: Thought content normal. Thought content does not include homicidal or suicidal ideation. Thought content does not include homicidal or suicidal plan.         Cognition and Memory: Cognition and memory normal.         Judgment: Judgment normal.          Result Review :                     Assessment and Plan    Diagnoses and all orders for this  visit:    1. Annual physical exam (Primary)  Assessment & Plan:  Labs drawn.  UA completed.  Blood pressure discussed.  PHQ-9 completed and discussed.  Proper diet and exercise plan discussed and encouraged.  Check feet daily.  Annual dental and eye exams encouraged.  Continue current meds.  She will discuss blood pressure further with her cardiologist.  Follow-up with cardiology tomorrow as scheduled.  Will order mammogram.  Will order low-dose lung CT scan.  Patient denies colonoscopy and understands the risks.  She states she will let me know when she wants to have this scheduled.  Denies Cologuard.  Patient denies Pap smear but I encouraged her to follow-up with gynecology to discuss Pap smear as she is due.  Smoking cessation discussed and encouraged.  She states tetanus vaccine is up-to-date within the past 10 years.  She denies all other immunizations including RSV pneumonia shingles and COVID and understands the risks of not doing.  Encouraged her to discuss further with her pharmacist.  Risk of meds discussed and understood.  Education provided.  Return to clinic or ED with any issues or concerns.    Orders:  -     CBC & Differential  -     Comprehensive Metabolic Panel  -     TSH Rfx On Abnormal To Free T4  -     POC Urinalysis Dipstick, Automated; Future    2. Essential hypertension  Assessment & Plan:  She is aware the blood pressure is slightly elevated today in clinic.  She will continue with her current medications and she will reach out to her cardiologist to discuss blood pressure further.  Proper diet and exercise plan discussed and encouraged.  Goal blood pressure less than 140/90.  Return to clinic or ED with any issues or concerns.      3. Anxiety  Assessment & Plan:  Informed her that taking the Lexapro only as needed is likely not benefiting her at all so we will stop the Lexapro completely.  Will try buspirone.  PHQ-9 completed and discussed.  No thoughts of suicide or hurting herself or  anyone else.  Risk of meds discussed and understood.  Education provided.  She will let me know in a couple of weeks how she is doing, sooner if needed.  Return to clinic or ED with any issues or concerns.    Orders:  -     busPIRone (BUSPAR) 5 MG tablet; Take 1 tablet by mouth 2 (Two) Times a Day.  Dispense: 60 tablet; Refill: 1    4. Other fatigue  Assessment & Plan:  Labs drawn.  UA completed.  Patient denies sleep study stating she does not have an issue with snoring.  Proper diet and exercise plan discussed and encouraged.  Education provided.  Return to clinic or ED with any issues or concerns.    Orders:  -     Iron  -     Ferritin  -     Folate  -     Vitamin D,25-Hydroxy  -     Vitamin B12    5. Tobacco use  Assessment & Plan:  Smoking cessation discussed and encouraged.    Orders:  -     CT Chest Low Dose Wo; Future    6. Screening for lung cancer  -     CT Chest Low Dose Wo; Future    7. Need for hepatitis C screening test  -     Hepatitis C Antibody    8. Encounter for screening mammogram for malignant neoplasm of breast  -     Mammo Screening Digital Tomosynthesis Bilateral With CAD; Future    9. Screening for cervical cancer    10. Screening for colon cancer    11. Hyperlipidemia LDL goal <100  Assessment & Plan:  Continue to follow-up with cardiology.    Orders:  -     Lipid Panel    12. Type 2 diabetes mellitus without complication, with long-term current use of insulin  -     Hemoglobin A1c  -     POC Microalbumin; Future              BMI is >= 25 and <30. (Overweight) The following options were offered after discussion;: exercise counseling/recommendations and nutrition counseling/recommendations       Follow Up   Return in about 6 months (around 12/25/2024), or if symptoms worsen or fail to improve.  Patient was given instructions and counseling regarding her condition or for health maintenance advice. Please see specific information pulled into the AVS if appropriate.     Popeye Carmichael,  APRN

## 2024-06-25 NOTE — ASSESSMENT & PLAN NOTE
Informed her that taking the Lexapro only as needed is likely not benefiting her at all so we will stop the Lexapro completely.  Will try buspirone.  PHQ-9 completed and discussed.  No thoughts of suicide or hurting herself or anyone else.  Risk of meds discussed and understood.  Education provided.  She will let me know in a couple of weeks how she is doing, sooner if needed.  Return to clinic or ED with any issues or concerns.

## 2024-06-25 NOTE — ASSESSMENT & PLAN NOTE
Labs drawn.  UA completed.  Blood pressure discussed.  PHQ-9 completed and discussed.  Proper diet and exercise plan discussed and encouraged.  Check feet daily.  Annual dental and eye exams encouraged.  Continue current meds.  She will discuss blood pressure further with her cardiologist.  Follow-up with cardiology tomorrow as scheduled.  Will order mammogram.  Will order low-dose lung CT scan.  Patient denies colonoscopy and understands the risks.  She states she will let me know when she wants to have this scheduled.  Denies Cologuard.  Patient denies Pap smear but I encouraged her to follow-up with gynecology to discuss Pap smear as she is due.  Smoking cessation discussed and encouraged.  She states tetanus vaccine is up-to-date within the past 10 years.  She denies all other immunizations including RSV pneumonia shingles and COVID and understands the risks of not doing.  Encouraged her to discuss further with her pharmacist.  Risk of meds discussed and understood.  Education provided.  Return to clinic or ED with any issues or concerns.

## 2024-06-25 NOTE — ASSESSMENT & PLAN NOTE
She is aware the blood pressure is slightly elevated today in clinic.  She will continue with her current medications and she will reach out to her cardiologist to discuss blood pressure further.  Proper diet and exercise plan discussed and encouraged.  Goal blood pressure less than 140/90.  Return to clinic or ED with any issues or concerns.

## 2024-06-26 ENCOUNTER — OFFICE VISIT (OUTPATIENT)
Dept: CARDIOLOGY | Facility: CLINIC | Age: 64
End: 2024-06-26
Payer: COMMERCIAL

## 2024-06-26 VITALS
HEART RATE: 82 BPM | WEIGHT: 175.6 LBS | BODY MASS INDEX: 28.22 KG/M2 | OXYGEN SATURATION: 97 % | SYSTOLIC BLOOD PRESSURE: 122 MMHG | HEIGHT: 66 IN | DIASTOLIC BLOOD PRESSURE: 78 MMHG | RESPIRATION RATE: 16 BRPM

## 2024-06-26 DIAGNOSIS — Z72.0 TOBACCO USE: ICD-10-CM

## 2024-06-26 DIAGNOSIS — E78.5 HYPERLIPIDEMIA LDL GOAL <100: ICD-10-CM

## 2024-06-26 DIAGNOSIS — I10 ESSENTIAL HYPERTENSION: Primary | ICD-10-CM

## 2024-06-26 LAB
25(OH)D3+25(OH)D2 SERPL-MCNC: 15.9 NG/ML (ref 30–100)
ALBUMIN SERPL-MCNC: 4.6 G/DL (ref 3.9–4.9)
ALP SERPL-CCNC: 113 IU/L (ref 44–121)
ALT SERPL-CCNC: 12 IU/L (ref 0–32)
AST SERPL-CCNC: 19 IU/L (ref 0–40)
BASOPHILS # BLD AUTO: 0.1 X10E3/UL (ref 0–0.2)
BASOPHILS NFR BLD AUTO: 1 %
BILIRUB SERPL-MCNC: 0.3 MG/DL (ref 0–1.2)
BUN SERPL-MCNC: 14 MG/DL (ref 8–27)
BUN/CREAT SERPL: 16 (ref 12–28)
CALCIUM SERPL-MCNC: 9.7 MG/DL (ref 8.7–10.3)
CHLORIDE SERPL-SCNC: 101 MMOL/L (ref 96–106)
CHOLEST SERPL-MCNC: 189 MG/DL (ref 100–199)
CO2 SERPL-SCNC: 19 MMOL/L (ref 20–29)
CREAT SERPL-MCNC: 0.87 MG/DL (ref 0.57–1)
EGFRCR SERPLBLD CKD-EPI 2021: 75 ML/MIN/1.73
EOSINOPHIL # BLD AUTO: 0.3 X10E3/UL (ref 0–0.4)
EOSINOPHIL NFR BLD AUTO: 2 %
ERYTHROCYTE [DISTWIDTH] IN BLOOD BY AUTOMATED COUNT: 13.9 % (ref 11.7–15.4)
FERRITIN SERPL-MCNC: 292 NG/ML (ref 15–150)
FOLATE SERPL-MCNC: 11 NG/ML
GLOBULIN SER CALC-MCNC: 2.7 G/DL (ref 1.5–4.5)
GLUCOSE SERPL-MCNC: 96 MG/DL (ref 70–99)
HBA1C MFR BLD: 6.6 % (ref 4.8–5.6)
HCT VFR BLD AUTO: 43.2 % (ref 34–46.6)
HCV IGG SERPL QL IA: NON REACTIVE
HDLC SERPL-MCNC: 64 MG/DL
HGB BLD-MCNC: 14.4 G/DL (ref 11.1–15.9)
IMM GRANULOCYTES # BLD AUTO: 0 X10E3/UL (ref 0–0.1)
IMM GRANULOCYTES NFR BLD AUTO: 0 %
IRON SERPL-MCNC: 88 UG/DL (ref 27–139)
LDLC SERPL CALC-MCNC: 99 MG/DL (ref 0–99)
LYMPHOCYTES # BLD AUTO: 3.5 X10E3/UL (ref 0.7–3.1)
LYMPHOCYTES NFR BLD AUTO: 29 %
MCH RBC QN AUTO: 30.3 PG (ref 26.6–33)
MCHC RBC AUTO-ENTMCNC: 33.3 G/DL (ref 31.5–35.7)
MCV RBC AUTO: 91 FL (ref 79–97)
MONOCYTES # BLD AUTO: 0.8 X10E3/UL (ref 0.1–0.9)
MONOCYTES NFR BLD AUTO: 7 %
NEUTROPHILS # BLD AUTO: 7.5 X10E3/UL (ref 1.4–7)
NEUTROPHILS NFR BLD AUTO: 61 %
PLATELET # BLD AUTO: 242 X10E3/UL (ref 150–450)
POTASSIUM SERPL-SCNC: 4.2 MMOL/L (ref 3.5–5.2)
PROT SERPL-MCNC: 7.3 G/DL (ref 6–8.5)
RBC # BLD AUTO: 4.76 X10E6/UL (ref 3.77–5.28)
SODIUM SERPL-SCNC: 143 MMOL/L (ref 134–144)
TRIGL SERPL-MCNC: 149 MG/DL (ref 0–149)
TSH SERPL DL<=0.005 MIU/L-ACNC: 1.46 UIU/ML (ref 0.45–4.5)
VIT B12 SERPL-MCNC: 276 PG/ML (ref 232–1245)
VLDLC SERPL CALC-MCNC: 26 MG/DL (ref 5–40)
WBC # BLD AUTO: 12.2 X10E3/UL (ref 3.4–10.8)

## 2024-06-26 PROCEDURE — 99214 OFFICE O/P EST MOD 30 MIN: CPT | Performed by: INTERNAL MEDICINE

## 2024-06-26 PROCEDURE — 93000 ELECTROCARDIOGRAM COMPLETE: CPT | Performed by: INTERNAL MEDICINE

## 2024-06-26 RX ORDER — AMLODIPINE AND VALSARTAN 5; 320 MG/1; MG/1
1 TABLET ORAL DAILY
Qty: 90 TABLET | Refills: 3 | Status: SHIPPED | OUTPATIENT
Start: 2024-06-26

## 2024-06-26 RX ORDER — NEBIVOLOL 10 MG/1
10 TABLET ORAL DAILY
Qty: 90 TABLET | Refills: 3 | Status: SHIPPED | OUTPATIENT
Start: 2024-06-26

## 2024-06-26 RX ORDER — ROSUVASTATIN CALCIUM 40 MG/1
40 TABLET, COATED ORAL NIGHTLY
Qty: 90 TABLET | Refills: 3 | Status: SHIPPED | OUTPATIENT
Start: 2024-06-26

## 2024-06-26 NOTE — PROGRESS NOTES
[facility]  [facility]  [address] [place] [address]  Dept: [phone]  Dept Fax: [phone]    Jory Taylor  1960    Follow Up Office Visit Note    History of Present Illness:  Jory Taylor is a 64 y.o. female who presents to the clinic for Follow-up.Hypertension- The BP is good 115.60 on Exforge 5.320, and Bystolic 10 mg, denies any complaints, EKG sinus will keep same meds    The following portions of the patient's history were reviewed and updated as appropriate: allergies, current medications, past family history, past medical history, past social history, past surgical history, and problem list.    Medications:  amLODIPine-valsartan  busPIRone  nebivolol  rosuvastatin    Subjective  Allergies   Allergen Reactions   • Cefdinir Unknown - High Severity   • Levofloxacin Unknown - High Severity   • Sulfamethoxazole Unknown - High Severity   • Trimethoprim Unknown - High Severity   • Penicillins Rash        Past Medical History:   Diagnosis Date   • Acute non-recurrent maxillary sinusitis    • Anxiety    • Benign hypertension    • BMI 30.0-30.9,adult    • CAD (coronary artery disease)    • Chest pain    • Cholelithiases    • Diabetes    • Dyslipidemia    • Hypercholesterolemia    • Irritable bowel disease    • Migraine headache    • Tobacco use        Past Surgical History:   Procedure Laterality Date   • CHOLECYSTECTOMY  1999   • TUBAL ABDOMINAL LIGATION Bilateral 2002       Family History   Problem Relation Age of Onset   • Mitral valve prolapse Mother    • Migraines Mother    • Diabetes Father    • Stroke Father    • Hypertension Father    • Coronary artery disease Father         Social History     Socioeconomic History   • Marital status:    Tobacco Use   • Smoking status: Every Day     Current packs/day: 0.50     Average packs/day: 0.5 packs/day for 43.5 years (21.7 ttl pk-yrs)     Types: Cigarettes     Start date: 1981   • Smokeless  "tobacco: Never   Vaping Use   • Vaping status: Never Used   Substance and Sexual Activity   • Alcohol use: Not Currently   • Drug use: Never   • Sexual activity: Defer       Review of Systems   Constitutional: Negative.    HENT: Negative.     Respiratory: Negative.     Cardiovascular: Negative.    Endocrine: Negative.    Genitourinary: Negative.    Musculoskeletal: Negative.    Skin: Negative.    Allergic/Immunologic: Negative.    Neurological: Negative.    Hematological: Negative.    Psychiatric/Behavioral: Negative.       Cardiovascular Procedures    ECHO/MUGA:  STRESS TESTS:   CARDIAC CATH:   DEVICES:   HOLTER:   CT/MRI:   VASCULAR:   CARDIOTHORACIC:     Objective  Vitals:    06/26/24 0934   BP: 122/78   BP Location: Right arm   Patient Position: Lying   Pulse: 82   Resp: 16   SpO2: 97%   Weight: 79.7 kg (175 lb 9.6 oz)   Height: 167.6 cm (66\")   PainSc: 0-No pain     Body mass index is 28.34 kg/m².     Physical Exam  Vitals reviewed.   Constitutional:       Appearance: Healthy appearance. Not in distress.   Neck:      Vascular: No JVR. JVD normal.   Pulmonary:      Effort: Pulmonary effort is normal.      Breath sounds: Normal breath sounds. No wheezing. No rhonchi. No rales.   Chest:      Chest wall: Not tender to palpatation.   Cardiovascular:      PMI at left midclavicular line. Normal rate. Regular rhythm. Normal S1. Normal S2.       Murmurs: There is no murmur.      No gallop.  No click. No rub.   Pulses:     Intact distal pulses.   Edema:     Peripheral edema absent.   Abdominal:      General: Bowel sounds are normal.      Palpations: Abdomen is soft.      Tenderness: There is no abdominal tenderness.   Musculoskeletal: Normal range of motion.         General: No tenderness. Skin:     General: Skin is warm and dry.   Neurological:      General: No focal deficit present.      Mental Status: Alert and oriented to person, place and time.        Diagnostic Data    ECG 12 Lead    Date/Time: 6/26/2024 9:59 " AM  Performed by: Satnam Guillen MD    Authorized by: Satnam Guillen MD  Comparison: compared with previous ECG from 6/26/2023  Similar to previous ECG  Rhythm: sinus rhythm  Rate: normal  BPM: 73  QRS axis: normal    Clinical impression: normal ECG      Assessment and Plan  Diagnoses and all orders for this visit:    Essential hypertension- BP is 115.60 on Exforge 5,320 and also Bystolic 10 mg, will keep same meds  -     amLODIPine-valsartan (EXFORGE) 5-320 MG per tablet; Take 1 tablet by mouth Daily.  -     nebivolol (BYSTOLIC) 10 MG tablet; Take 1 tablet by mouth Daily.    Hyperlipidemia LDL goal <100- On Crestor 10, we will try to increase the Crestor to 40 mg, we are waiting for lab done yesterday  -     rosuvastatin (CRESTOR) 40 MG tablet; Take 1 tablet by mouth Every Night.    Tobacco use- Still smoking         Return in about 1 year (around 6/26/2025) for Recheck with Dr. Guillen.    Satnam Guillen MD  06/26/2024

## 2024-06-27 ENCOUNTER — TELEPHONE (OUTPATIENT)
Dept: CARDIOLOGY | Facility: CLINIC | Age: 64
End: 2024-06-27
Payer: COMMERCIAL

## 2024-07-02 ENCOUNTER — OFFICE VISIT (OUTPATIENT)
Dept: FAMILY MEDICINE CLINIC | Facility: CLINIC | Age: 64
End: 2024-07-02
Payer: COMMERCIAL

## 2024-07-02 VITALS
HEART RATE: 98 BPM | SYSTOLIC BLOOD PRESSURE: 122 MMHG | HEIGHT: 66 IN | DIASTOLIC BLOOD PRESSURE: 78 MMHG | BODY MASS INDEX: 27.87 KG/M2 | WEIGHT: 173.44 LBS | OXYGEN SATURATION: 100 %

## 2024-07-02 DIAGNOSIS — R79.89 ELEVATED FERRITIN: ICD-10-CM

## 2024-07-02 DIAGNOSIS — E78.5 HYPERLIPIDEMIA LDL GOAL <100: ICD-10-CM

## 2024-07-02 DIAGNOSIS — E11.9 TYPE 2 DIABETES MELLITUS WITHOUT COMPLICATION, WITHOUT LONG-TERM CURRENT USE OF INSULIN: ICD-10-CM

## 2024-07-02 DIAGNOSIS — E55.9 VITAMIN D DEFICIENCY: Primary | ICD-10-CM

## 2024-07-02 DIAGNOSIS — J01.00 ACUTE NON-RECURRENT MAXILLARY SINUSITIS: ICD-10-CM

## 2024-07-02 DIAGNOSIS — I10 ESSENTIAL HYPERTENSION: ICD-10-CM

## 2024-07-02 DIAGNOSIS — D72.829 LEUKOCYTOSIS, UNSPECIFIED TYPE: ICD-10-CM

## 2024-07-02 PROCEDURE — 99214 OFFICE O/P EST MOD 30 MIN: CPT | Performed by: NURSE PRACTITIONER

## 2024-07-02 RX ORDER — ROSUVASTATIN CALCIUM 10 MG/1
10 TABLET, COATED ORAL DAILY
Qty: 90 TABLET | Refills: 1 | Status: SHIPPED | OUTPATIENT
Start: 2024-07-02

## 2024-07-02 RX ORDER — ERGOCALCIFEROL 1.25 MG/1
50000 CAPSULE ORAL WEEKLY
Qty: 12 CAPSULE | Refills: 0 | Status: SHIPPED | OUTPATIENT
Start: 2024-07-02

## 2024-07-02 RX ORDER — DOXYCYCLINE HYCLATE 100 MG/1
100 CAPSULE ORAL 2 TIMES DAILY
Qty: 20 CAPSULE | Refills: 0 | Status: SHIPPED | OUTPATIENT
Start: 2024-07-02

## 2024-07-02 RX ORDER — DAPAGLIFLOZIN 5 MG/1
5 TABLET, FILM COATED ORAL DAILY
Qty: 30 TABLET | Refills: 3 | Status: SHIPPED | OUTPATIENT
Start: 2024-07-02

## 2024-07-02 RX ORDER — PREDNISONE 20 MG/1
40 TABLET ORAL DAILY
Qty: 10 TABLET | Refills: 0 | Status: SHIPPED | OUTPATIENT
Start: 2024-07-02

## 2024-07-02 NOTE — PROGRESS NOTES
"Chief Complaint  Abnormal Lab    Subjective          Jory Taylor presents to [facility]  Abnormal Lab  Associated symptoms include congestion and coughing. Pertinent negatives include no abdominal pain, chest pain, chills, fatigue, fever, nausea, numbness, sore throat, swollen glands or vomiting.       To discuss abnormal labs that she had drawn on 6/25/2024.  A1c was elevated at 6.6.  Ferritin was high at 292.  Vitamin D low at 15.9.  White blood cell count was elevated 12.2.  Neutrophils and lymphocytes were also elevated to 7.5 and 3.5 respectively.    States she is doing well other than having what she believes is a sinus infection.  Sinus pressure and drainage.  No fever no chills no body aches.  Occasional cough.  No sick contacts that she is aware of.  No GI issues.  States this has been going on for a long time.    She also is requesting referral to a new cardiologist.  Requesting Dr. De La Paz.  States Dr. Dunaway increased her rosuvastatin to 40 mg and she states she will not take that and wants to go back to the 10 mg so is asking if I can send that in.  She understands the risks.    Objective   Vital Signs:   /78   Pulse 98   Ht 167.6 cm (66\")   Wt 78.7 kg (173 lb 7 oz)   SpO2 100%   BMI 27.99 kg/m²     Body mass index is 27.99 kg/m².    Review of Systems   Constitutional:  Negative for chills, fatigue and fever.   HENT:  Positive for congestion and sinus pressure. Negative for postnasal drip, rhinorrhea, sneezing, sore throat, swollen glands and trouble swallowing.    Eyes:  Negative for visual disturbance.   Respiratory:  Positive for cough. Negative for shortness of breath and wheezing.    Cardiovascular:  Negative for chest pain and palpitations.   Gastrointestinal:  Negative for abdominal pain, diarrhea, nausea and vomiting.   Endocrine: Negative for polydipsia, polyphagia and polyuria.   Genitourinary:  Negative for decreased urine volume, dysuria, " frequency, hematuria and urgency.   Musculoskeletal:  Negative for back pain.   Neurological:  Negative for numbness and headache.       Past History:  Medical History: has a past medical history of Acute non-recurrent maxillary sinusitis, Anxiety, Benign hypertension, BMI 30.0-30.9,adult, CAD (coronary artery disease), Chest pain, Cholelithiases, Diabetes, Dyslipidemia, Hypercholesterolemia, Irritable bowel disease, Migraine headache, and Tobacco use.   Surgical History: has a past surgical history that includes Cholecystectomy (1999) and Tubal ligation (Bilateral, 2002).   Family History: family history includes Coronary artery disease in her father; Diabetes in her father; Hypertension in her father; Migraines in her mother; Mitral valve prolapse in her mother; Stroke in her father.   Social History: reports that she has been smoking cigarettes. She started smoking about 43 years ago. She has a 21.8 pack-year smoking history. She has never used smokeless tobacco. She reports that she does not currently use alcohol. She reports that she does not use drugs.    PHQ-2 Depression Screening  Little interest or pleasure in doing things?     Feeling down, depressed, or hopeless?     PHQ-2 Total Score          PHQ-9 Depression Screening  Little interest or pleasure in doing things?     Feeling down, depressed, or hopeless?     Trouble falling or staying asleep, or sleeping too much?     Feeling tired or having little energy?     Poor appetite or overeating?     Feeling bad about yourself - or that you are a failure or have let yourself or your family down?     Trouble concentrating on things, such as reading the newspaper or watching television?     Moving or speaking so slowly that other people could have noticed? Or the opposite - being so fidgety or restless that you have been moving around a lot more than usual?     Thoughts that you would be better off dead, or of hurting yourself in some way?     PHQ-9 Total Score      If you checked off any problems, how difficult have these problems made it for you to do your work, take care of things at home, or get along with other people?       PHQ-9 Total Score:        Patient screened positive for depression based on a PHQ-9 score of 0 on 6/25/2024. Follow-up recommendations include:          Current Outpatient Medications:     amLODIPine-valsartan (EXFORGE) 5-320 MG per tablet, Take 1 tablet by mouth Daily., Disp: 90 tablet, Rfl: 3    busPIRone (BUSPAR) 5 MG tablet, Take 1 tablet by mouth 2 (Two) Times a Day., Disp: 60 tablet, Rfl: 1    nebivolol (BYSTOLIC) 10 MG tablet, Take 1 tablet by mouth Daily., Disp: 90 tablet, Rfl: 3    dapagliflozin (Farxiga) 5 MG tablet tablet, Take 1 tablet by mouth Daily., Disp: 30 tablet, Rfl: 3    doxycycline (VIBRAMYCIN) 100 MG capsule, Take 1 capsule by mouth 2 (Two) Times a Day., Disp: 20 capsule, Rfl: 0    predniSONE (DELTASONE) 20 MG tablet, Take 2 tablets by mouth Daily., Disp: 10 tablet, Rfl: 0    rosuvastatin (Crestor) 10 MG tablet, Take 1 tablet by mouth Daily., Disp: 90 tablet, Rfl: 1    vitamin D (ERGOCALCIFEROL) 1.25 MG (76608 UT) capsule capsule, Take 1 capsule by mouth 1 (One) Time Per Week., Disp: 12 capsule, Rfl: 0   (Not in a hospital admission)     Allergies: Cefdinir, Levofloxacin, Sulfamethoxazole, Trimethoprim, and Penicillins    Physical Exam  Constitutional:       Appearance: Normal appearance.   HENT:      Right Ear: Tympanic membrane, ear canal and external ear normal.      Left Ear: Tympanic membrane, ear canal and external ear normal.      Nose: Congestion present.      Comments: Maxillary sinuses tender bilaterally.     Mouth/Throat:      Mouth: Mucous membranes are moist.      Pharynx: Oropharynx is clear.   Eyes:      Conjunctiva/sclera: Conjunctivae normal.      Pupils: Pupils are equal, round, and reactive to light.   Cardiovascular:      Rate and Rhythm: Normal rate and regular rhythm.      Heart sounds: Normal heart sounds.    Pulmonary:      Effort: Pulmonary effort is normal.      Breath sounds: Normal breath sounds.   Neurological:      General: No focal deficit present.      Mental Status: She is alert and oriented to person, place, and time. Mental status is at baseline.   Psychiatric:         Mood and Affect: Mood normal.         Behavior: Behavior normal.         Thought Content: Thought content normal.         Judgment: Judgment normal.          Result Review :                   Assessment and Plan    Diagnoses and all orders for this visit:    1. Vitamin D deficiency (Primary)  Assessment & Plan:  We discussed all recent labs.  Vitamin D supplement sent.  Recheck level 3 months.  Education provided.  Return to clinic or ED with any issues or concerns.    Orders:  -     vitamin D (ERGOCALCIFEROL) 1.25 MG (08869 UT) capsule capsule; Take 1 capsule by mouth 1 (One) Time Per Week.  Dispense: 12 capsule; Refill: 0    2. Elevated ferritin  Assessment & Plan:  Patient wants to continue to monitor and states she will return in 2 weeks for repeat ferritin level check.  Smoking cessation discussed and encouraged.  Education provided.  Return to clinic or ED with any issues or concerns.    Orders:  -     Ferritin    3. Leukocytosis, unspecified type  Assessment & Plan:  Patient wants to continue to monitor.  She will return in 2 weeks for repeat CBC.  Education provided.  Return to clinic or ED with any issues or concerns.    Orders:  -     CBC & Differential    4. Type 2 diabetes mellitus without complication, without long-term current use of insulin  Assessment & Plan:  Patient states she feels she already has too many stomach issues so does not want to do metformin.  We will try Farxiga.  Check feet daily.  Annual eye exams encouraged.  Proper diet and exercise plan discussed and encouraged.  Risk of meds discussed and understood.  Recheck 3 months.  Return to clinic or ED with any issues or concerns.    Orders:  -     dapagliflozin  (Farxiga) 5 MG tablet tablet; Take 1 tablet by mouth Daily.  Dispense: 30 tablet; Refill: 3    5. Acute non-recurrent maxillary sinusitis  Assessment & Plan:  Antibiotic as prescribed.  Allergy medication encouraged.  Flonase encouraged.  Due to the length of symptoms we will also treat with a round of prednisone.  Avoid NSAIDs while on prednisone.  Fluids and rest encouraged.  Risk of meds discussed and understood.  Education provided.  Return in 2 days if no improvement, sooner if worsens.  Return to clinic or ED with any issues or concerns.    Orders:  -     doxycycline (VIBRAMYCIN) 100 MG capsule; Take 1 capsule by mouth 2 (Two) Times a Day.  Dispense: 20 capsule; Refill: 0  -     predniSONE (DELTASONE) 20 MG tablet; Take 2 tablets by mouth Daily.  Dispense: 10 tablet; Refill: 0    6. Essential hypertension  Assessment & Plan:  Continue with current medications.  Goal blood pressure less than 140/90.  Let me know if gets 2 or above that.  New cardiology referral placed.  Risk discussed and understood.  Education provided.  Return to clinic or ED with any issues or concerns.    Orders:  -     Ambulatory Referral to Cardiology    7. Hyperlipidemia LDL goal <100  Assessment & Plan:  Will decrease rosuvastatin to 10 mg per patient request.  She understands the risks.  Proper diet and exercise plan discussed and encouraged.  Will place new cardiology referral.  Return to clinic or ED with any issues or concerns.    Orders:  -     rosuvastatin (Crestor) 10 MG tablet; Take 1 tablet by mouth Daily.  Dispense: 90 tablet; Refill: 1  -     Ambulatory Referral to Cardiology                      Follow Up   Return in about 3 months (around 10/2/2024), or if symptoms worsen or fail to improve.  Patient was given instructions and counseling regarding her condition or for health maintenance advice. Please see specific information pulled into the AVS if appropriate.     TOMASA Bethea

## 2024-07-02 NOTE — ASSESSMENT & PLAN NOTE
Patient wants to continue to monitor.  She will return in 2 weeks for repeat CBC.  Education provided.  Return to clinic or ED with any issues or concerns.

## 2024-07-02 NOTE — ASSESSMENT & PLAN NOTE
Patient wants to continue to monitor and states she will return in 2 weeks for repeat ferritin level check.  Smoking cessation discussed and encouraged.  Education provided.  Return to clinic or ED with any issues or concerns.

## 2024-07-02 NOTE — ASSESSMENT & PLAN NOTE
Antibiotic as prescribed.  Allergy medication encouraged.  Flonase encouraged.  Due to the length of symptoms we will also treat with a round of prednisone.  Avoid NSAIDs while on prednisone.  Fluids and rest encouraged.  Risk of meds discussed and understood.  Education provided.  Return in 2 days if no improvement, sooner if worsens.  Return to clinic or ED with any issues or concerns.

## 2024-07-02 NOTE — ASSESSMENT & PLAN NOTE
Continue with current medications.  Goal blood pressure less than 140/90.  Let me know if gets 2 or above that.  New cardiology referral placed.  Risk discussed and understood.  Education provided.  Return to clinic or ED with any issues or concerns.

## 2024-07-02 NOTE — ASSESSMENT & PLAN NOTE
Patient states she feels she already has too many stomach issues so does not want to do metformin.  We will try Farxiga.  Check feet daily.  Annual eye exams encouraged.  Proper diet and exercise plan discussed and encouraged.  Risk of meds discussed and understood.  Recheck 3 months.  Return to clinic or ED with any issues or concerns.

## 2024-07-02 NOTE — ASSESSMENT & PLAN NOTE
We discussed all recent labs.  Vitamin D supplement sent.  Recheck level 3 months.  Education provided.  Return to clinic or ED with any issues or concerns.

## 2024-07-02 NOTE — ASSESSMENT & PLAN NOTE
Will decrease rosuvastatin to 10 mg per patient request.  She understands the risks.  Proper diet and exercise plan discussed and encouraged.  Will place new cardiology referral.  Return to clinic or ED with any issues or concerns.

## 2024-07-17 ENCOUNTER — TELEPHONE (OUTPATIENT)
Dept: FAMILY MEDICINE CLINIC | Facility: CLINIC | Age: 64
End: 2024-07-17
Payer: COMMERCIAL

## 2024-07-17 ENCOUNTER — LAB (OUTPATIENT)
Dept: FAMILY MEDICINE CLINIC | Facility: CLINIC | Age: 64
End: 2024-07-17
Payer: COMMERCIAL

## 2024-07-17 NOTE — TELEPHONE ENCOUNTER
Pt called stating that she recently finished antibiotics and steroids. She feels like she has a yeast infection as a result with burning and itching.     I asked patient if she would like me to reach out to the provider to see if he would send in a diflucan. Pt declined at this time stating that she is going to use monistat and see if that resolves her issue.     She will call back if she needs something called in.

## 2024-07-18 LAB
BASOPHILS # BLD AUTO: 0.1 X10E3/UL (ref 0–0.2)
BASOPHILS NFR BLD AUTO: 1 %
EOSINOPHIL # BLD AUTO: 0.3 X10E3/UL (ref 0–0.4)
EOSINOPHIL NFR BLD AUTO: 2 %
ERYTHROCYTE [DISTWIDTH] IN BLOOD BY AUTOMATED COUNT: 15.1 % (ref 11.7–15.4)
FERRITIN SERPL-MCNC: 316 NG/ML (ref 15–150)
HCT VFR BLD AUTO: 43.8 % (ref 34–46.6)
HGB BLD-MCNC: 13.9 G/DL (ref 11.1–15.9)
IMM GRANULOCYTES # BLD AUTO: 0 X10E3/UL (ref 0–0.1)
IMM GRANULOCYTES NFR BLD AUTO: 0 %
LYMPHOCYTES # BLD AUTO: 3 X10E3/UL (ref 0.7–3.1)
LYMPHOCYTES NFR BLD AUTO: 27 %
MCH RBC QN AUTO: 30 PG (ref 26.6–33)
MCHC RBC AUTO-ENTMCNC: 31.7 G/DL (ref 31.5–35.7)
MCV RBC AUTO: 95 FL (ref 79–97)
MONOCYTES # BLD AUTO: 0.9 X10E3/UL (ref 0.1–0.9)
MONOCYTES NFR BLD AUTO: 8 %
NEUTROPHILS # BLD AUTO: 6.8 X10E3/UL (ref 1.4–7)
NEUTROPHILS NFR BLD AUTO: 62 %
PLATELET # BLD AUTO: 232 X10E3/UL (ref 150–450)
RBC # BLD AUTO: 4.63 X10E6/UL (ref 3.77–5.28)
WBC # BLD AUTO: 11.1 X10E3/UL (ref 3.4–10.8)

## 2024-07-25 ENCOUNTER — TELEPHONE (OUTPATIENT)
Dept: FAMILY MEDICINE CLINIC | Facility: CLINIC | Age: 64
End: 2024-07-25
Payer: COMMERCIAL

## 2024-07-25 DIAGNOSIS — R79.89 ELEVATED FERRITIN: ICD-10-CM

## 2024-07-25 DIAGNOSIS — D72.829 LEUKOCYTOSIS, UNSPECIFIED TYPE: Primary | ICD-10-CM

## 2024-07-25 NOTE — TELEPHONE ENCOUNTER
Name: Jory Taylor    Relationship: Self    Best Callback Number: [phone]     HUB PROVIDED THE RELAY MESSAGE FROM THE OFFICE   PATIENT VOICED UNDERSTANDING AND HAS NO FURTHER QUESTIONS AT THIS TIME    ADDITIONAL INFORMATION: PATIENT AGREED TO THE REFERRAL         Lori Scott MA Medical Assistant Signed 0111       HUB TO RELAY     Please let patient know from labs her white blood cell count has remained slightly elevated.  Her ferritin which is the stored iron in the body is also continue to be elevated and has slightly increased.  I would recommend that we set her up with a hematologist to evaluate this further.  Is Patient fine if I put that referral in?  Let me know.  Thanks

## 2024-07-25 NOTE — TELEPHONE ENCOUNTER
HUB TO RELAY    Please let patient know from labs her white blood cell count has remained slightly elevated.  Her ferritin which is the stored iron in the body is also continue to be elevated and has slightly increased.  I would recommend that we set her up with a hematologist to evaluate this further.  Is Patient fine if I put that referral in?  Let me know.  Thanks

## 2024-07-29 ENCOUNTER — TELEPHONE (OUTPATIENT)
Dept: FAMILY MEDICINE CLINIC | Facility: CLINIC | Age: 64
End: 2024-07-29
Payer: COMMERCIAL

## 2024-07-29 NOTE — TELEPHONE ENCOUNTER
HUB TO RELAY: LVM for pt to call back to find out what additional questions and concerns pt has regarding lab results. Where would pt like to be referred to?

## 2024-07-29 NOTE — TELEPHONE ENCOUNTER
Name: Jory Taylor      Relationship: Self      Best Callback Number: [phone]       HUB PROVIDED THE RELAY MESSAGE FROM THE OFFICE      PATIENT: HAS FURTHER QUESTIONS AND WOULD LIKE A CALL BACK AT THE FOLLOWING PHONE [phone]    ADDITIONAL INFORMATION: PATIENT STATES SHE RESPONDED TO THIS MESSAGE LAST WEEK AND SHE HAS BEEN WAITING TO HEAR BACK TO DISCUSS WHERE SHE WANTS TO BE SEEN.

## 2024-07-29 NOTE — TELEPHONE ENCOUNTER
Name: Jory Taylor      Relationship: Self      Best Callback Number:       HUB PROVIDED THE RELAY MESSAGE FROM THE OFFICE  HUB TO RELAY: LVM for pt to call back to find out what additional questions and concerns pt has regarding lab results. Where would pt like to be referred to?       PATIENT: HAS FURTHER QUESTIONS AND WOULD LIKE A CALL BACK AT THE FOLLOWING PHONE [phone]    ADDITIONAL INFORMATION:  JORY WANTS TO BE REFERRED TO A DR IN HER INSURANCE NETWORK.  [facility], [facility]

## 2024-07-29 NOTE — TELEPHONE ENCOUNTER
HUB TO RELAY: [facility] for pt to call back.   ----- Message from Popeye Carmichael sent at 7/25/2024 12:45 PM EDT -----  Please let patient know from labs her white blood cell count has remained slightly elevated.  Her ferritin which is the stored iron in the body is also continue to be elevated and has slightly increased.  I would recommend that we set her up with a hematologist to evaluate this further.  Is Patient fine if I put that referral in?  Let me know.  Thanks

## 2024-07-30 NOTE — TELEPHONE ENCOUNTER
I placed the referral to hematology on 7/26/2024.  Please check with the referral team to check this is being taken care of and with the patient's requests wanting to be seen in [place].  Please make sure patient is aware of all this.  Thanks

## 2024-07-31 ENCOUNTER — HOSPITAL ENCOUNTER (OUTPATIENT)
Dept: MAMMOGRAPHY | Facility: HOSPITAL | Age: 64
Discharge: HOME | End: 2024-07-31
Admitting: NURSE PRACTITIONER
Payer: COMMERCIAL

## 2024-07-31 DIAGNOSIS — Z12.31 ENCOUNTER FOR SCREENING MAMMOGRAM FOR MALIGNANT NEOPLASM OF BREAST: ICD-10-CM

## 2024-07-31 PROCEDURE — 77067 SCR MAMMO BI INCL CAD: CPT

## 2024-07-31 PROCEDURE — 77063 BREAST TOMOSYNTHESIS BI: CPT

## 2024-08-01 PROCEDURE — 77063 BREAST TOMOSYNTHESIS BI: CPT | Performed by: RADIOLOGY

## 2024-08-01 PROCEDURE — 77067 SCR MAMMO BI INCL CAD: CPT | Performed by: RADIOLOGY

## 2024-08-02 ENCOUNTER — TELEPHONE (OUTPATIENT)
Dept: FAMILY MEDICINE CLINIC | Facility: CLINIC | Age: 64
End: 2024-08-02
Payer: COMMERCIAL

## 2024-08-02 NOTE — TELEPHONE ENCOUNTER
Caller: Jory Taylor     Relationship: PATIENT    Best call back number:  [phone]     What is your medical concern? DR ISLAS IS AN ONCOLOGIST. SO THE PATIENT IS VERY CONCERNED. SHE THOUGHT SHE WAS SEEING A HEMATOLOGIST.    BRUNO WORKS IN MORNING BUT NOT IN THE AFTERNOON.   SHOULD WE INCREASE HER DOSAGE?    BUSPAR,  2 PILLS MAKES HER SICK. DOES SHE NEED A MEDICATION CHANGE?     How long has this issue been going on? FEW WEEKS    Is your provider already aware of this issue? NO    Have you been treated for this issue? NO.    PLEASE ADVISE. PATIENT WANTS MED CHANGES SENT TO [address]

## 2024-08-07 NOTE — TELEPHONE ENCOUNTER
Dr. Lopez is a hematologist/oncologist. He is a blood specialist. Not concerned for cancer so she doesn't need to be anxious about that. Just want him to evaluate her elevated white blood cell count.    Make sure she is taking the buspirone twice daily and not two at one time.     Her last A1c for diabetes wasn't that bad, was 6.6. I would recommend we increase the dosage at this time. Recommend we check levels (A1c) in a month or so and go from there. Thanks

## 2024-08-09 NOTE — TELEPHONE ENCOUNTER
Please let patient know that that was a typo in my last message.  I recommend NOT increasing the Farxiga at this time as her last A1c was 6.6 which is when we started the 5 mg Farxiga.  I would recommend that she take it a couple more months and then we recheck her levels before making determinations to increase it.    She can stop the buspirone if she does not want to take it.  Would she like to try something such as Prozac for her anxiety?  If so this is a medication she will need to take every day for it to become effective.  Let me know.  Thanks

## 2024-08-09 NOTE — TELEPHONE ENCOUNTER
Name: Jory Taylor      Relationship: Self      Best Callback Number: [phone]       HUB PROVIDED THE RELAY MESSAGE FROM THE OFFICE      PATIENT: HAS FURTHER QUESTIONS AND WOULD LIKE A CALL BACK AT THE FOLLOWING PHONE [phone]    ADDITIONAL INFORMATION: PATIENT STATED THAT BUSPIRONE ISN'T GOING TO WORK FOR HER REGARDLESS OF HOW/WHEN/DOSAGE SHE TAKES. IT MAKES HER SICK.  FARXIGA 10 MG? IF SO SHE HAS A RX FOR 5MG SO WILL TAKE TWO PILLS.  PLEASE ADVISE IF REPLACING BUSPIRONE WITH DIFFERENT MEDICATION AND IF SENDING IN NEW RX FOR FARXIGA 10 MG.  THANK YOU

## 2024-08-09 NOTE — TELEPHONE ENCOUNTER
Name: Brandon Jory Kevin      Relationship: Self      Best Callback Number: [phone]      HUB PROVIDED THE RELAY MESSAGE FROM THE OFFICE      PATIENT: VOICED UNDERSTANDING AND HAS NO FURTHER QUESTIONS AT THIS TIME    ADDITIONAL INFORMATION: PATIENT IS GOING TO QUIT TAKING THE BUSIPRONE. BUT NOT INTERESTED IN PROZAC. BUT WILL REVISIT IT IN FUTURE IF NEEDED.      PATIENT UNDERSTOOD NOT TO INCREASE FARXIGA.

## 2024-08-09 NOTE — TELEPHONE ENCOUNTER
HUB TO RELAY    Please let patient know that that was a typo in my last message.  I recommend NOT increasing the Farxiga at this time as her last A1c was 6.6 which is when we started the 5 mg Farxiga.  I would recommend that she take it a couple more months and then we recheck her levels before making determinations to increase it.     She can stop the buspirone if she does not want to take it.  Would she like to try something such as Prozac for her anxiety?  If so this is a medication she will need to take every day for it to become effective.  Let me know.  Thanks

## 2024-08-09 NOTE — TELEPHONE ENCOUNTER
LVM for pt to call us back     Hub to relay:  Dr. Lopez is a hematologist/oncologist. He is a blood specialist. Not concerned for cancer so she doesn't need to be anxious about that. Just want him to evaluate her elevated white blood cell count.     Make sure she is taking the buspirone twice daily and not two at one time.      Her last A1c for diabetes wasn't that bad, was 6.6. I would recommend we increase the dosage at this time. Recommend we check levels (A1c) in a month or so and go from there. Thanks

## 2024-08-14 DIAGNOSIS — R93.89 ABNORMAL CT SCAN: Primary | ICD-10-CM

## 2024-08-17 DIAGNOSIS — F41.9 ANXIETY: ICD-10-CM

## 2024-08-19 RX ORDER — BUSPIRONE HYDROCHLORIDE 5 MG/1
5 TABLET ORAL 2 TIMES DAILY
Qty: 60 TABLET | Refills: 0 | Status: SHIPPED | OUTPATIENT
Start: 2024-08-19

## 2024-08-20 ENCOUNTER — TELEPHONE (OUTPATIENT)
Dept: FAMILY MEDICINE CLINIC | Facility: CLINIC | Age: 64
End: 2024-08-20

## 2024-08-21 ENCOUNTER — OFFICE VISIT (OUTPATIENT)
Dept: FAMILY MEDICINE CLINIC | Facility: CLINIC | Age: 64
End: 2024-08-21
Payer: COMMERCIAL

## 2024-08-21 VITALS
WEIGHT: 175.3 LBS | HEIGHT: 66 IN | OXYGEN SATURATION: 97 % | BODY MASS INDEX: 28.17 KG/M2 | HEART RATE: 88 BPM | DIASTOLIC BLOOD PRESSURE: 76 MMHG | SYSTOLIC BLOOD PRESSURE: 128 MMHG

## 2024-08-21 DIAGNOSIS — J01.00 ACUTE NON-RECURRENT MAXILLARY SINUSITIS: Primary | ICD-10-CM

## 2024-08-21 PROCEDURE — 99213 OFFICE O/P EST LOW 20 MIN: CPT | Performed by: NURSE PRACTITIONER

## 2024-08-21 RX ORDER — DOXYCYCLINE HYCLATE 100 MG/1
100 CAPSULE ORAL 2 TIMES DAILY
Qty: 20 CAPSULE | Refills: 0 | Status: SHIPPED | OUTPATIENT
Start: 2024-08-21

## 2024-08-21 RX ORDER — PREDNISONE 20 MG/1
40 TABLET ORAL DAILY
Qty: 10 TABLET | Refills: 0 | Status: SHIPPED | OUTPATIENT
Start: 2024-08-21

## 2024-08-21 NOTE — ASSESSMENT & PLAN NOTE
Antibiotic as prescribed.  Prednisone as prescribed.  Avoid NSAIDs while on prednisone.  Continue with allergy meds and Flonase.  Fluids and rest encouraged.  Risk of meds discussed and understood.  Education provided.  Return in 2 days if no improvement, sooner if worsens.  Patient states she plans on following up in the near future for routine check-in.  Return to clinic or ED with any issues or concerns.

## 2024-08-21 NOTE — PROGRESS NOTES
"Chief Complaint  Follow-up (Was treated on Aug 13,2024 for sinus zpak from dentist ) and sinus pain pressure     Subjective          Jory Taylor presents to [facility]  Follow-upCurrent symptoms include no chest pain, no nausea, no shortness of breath, no fatigue, no wheezing, no abdominal pain, no cough and no sore throat.       Patient states for the past 10+ days she has had maxillary sinus pressure fullness and congestion.  We treated her for sinus infection 1 July and she states it cleared up completely with doxycycline and prednisone.  States she went to her dentist recently on the 13th thinking she had a toothache and they did an x-ray and was told she had a sinus infection.  Did a round of Z-Osorio but states it did not clear.  States Z-Osorio helped but she still has a lot of sinus pressure and congestion particularly on the right side.  States she has been around a lot of sick people at work.  States she knows this is a continued sinus infection and is requesting steroids and doxycycline as she states this works wonderful for her last time.  No fever no chills no body aches.    Objective   Vital Signs:   /76 (BP Location: Left arm, Patient Position: Sitting, Cuff Size: Adult)   Pulse 88   Ht 167.6 cm (66\")   Wt 79.5 kg (175 lb 4.8 oz)   SpO2 97%   BMI 28.29 kg/m²     Body mass index is 28.29 kg/m².    Review of Systems   Constitutional:  Negative for chills, fatigue and fever.   HENT:  Positive for congestion and sinus pressure. Negative for sneezing, sore throat, swollen glands and trouble swallowing.    Respiratory:  Negative for cough, shortness of breath and wheezing.    Cardiovascular:  Negative for chest pain.   Gastrointestinal:  Negative for abdominal pain, diarrhea, nausea and vomiting.   Genitourinary:  Negative for dysuria.   Musculoskeletal:  Negative for back pain.   Neurological:  Negative for headache.       Past History:  Medical History: has " a past medical history of Acute non-recurrent maxillary sinusitis, Anxiety, Benign hypertension, BMI 30.0-30.9,adult, CAD (coronary artery disease), Chest pain, Cholelithiases, Diabetes, Dyslipidemia, Hypercholesterolemia, Irritable bowel disease, Migraine headache, and Tobacco use.   Surgical History: has a past surgical history that includes Cholecystectomy (1999); Tubal ligation (Bilateral, 2002); and Hysterectomy.   Family History: family history includes Breast cancer in her maternal aunt and maternal aunt; Breast cancer (age of onset: 81) in her mother; Coronary artery disease in her father; Diabetes in her father; Hypertension in her father; Migraines in her mother; Mitral valve prolapse in her mother; Stroke in her father.   Social History: reports that she has been smoking cigarettes. She started smoking about 43 years ago. She has a 21.8 pack-year smoking history. She has never used smokeless tobacco. She reports that she does not currently use alcohol. She reports that she does not use drugs.    PHQ-2 Depression Screening  Little interest or pleasure in doing things?     Feeling down, depressed, or hopeless?     PHQ-2 Total Score          PHQ-9 Depression Screening  Little interest or pleasure in doing things?     Feeling down, depressed, or hopeless?     Trouble falling or staying asleep, or sleeping too much?     Feeling tired or having little energy?     Poor appetite or overeating?     Feeling bad about yourself - or that you are a failure or have let yourself or your family down?     Trouble concentrating on things, such as reading the newspaper or watching television?     Moving or speaking so slowly that other people could have noticed? Or the opposite - being so fidgety or restless that you have been moving around a lot more than usual?     Thoughts that you would be better off dead, or of hurting yourself in some way?     PHQ-9 Total Score     If you checked off any problems, how difficult have  these problems made it for you to do your work, take care of things at home, or get along with other people?       PHQ-9 Total Score:        Patient screened positive for depression based on a PHQ-9 score of 0 on 6/25/2024. Follow-up recommendations include:       Current Outpatient Medications:     amLODIPine-valsartan (EXFORGE) 5-320 MG per tablet, Take 1 tablet by mouth Daily., Disp: 90 tablet, Rfl: 3    dapagliflozin (Farxiga) 5 MG tablet tablet, Take 1 tablet by mouth Daily., Disp: 30 tablet, Rfl: 3    nebivolol (BYSTOLIC) 10 MG tablet, Take 1 tablet by mouth Daily., Disp: 90 tablet, Rfl: 3    rosuvastatin (Crestor) 10 MG tablet, Take 1 tablet by mouth Daily., Disp: 90 tablet, Rfl: 1    vitamin D (ERGOCALCIFEROL) 1.25 MG (08887 UT) capsule capsule, Take 1 capsule by mouth 1 (One) Time Per Week., Disp: 12 capsule, Rfl: 0    busPIRone (BUSPAR) 5 MG tablet, TAKE 1 TABLET BY MOUTH TWICE A DAY (Patient not taking: Reported on 8/21/2024), Disp: 60 tablet, Rfl: 0    doxycycline (VIBRAMYCIN) 100 MG capsule, Take 1 capsule by mouth 2 (Two) Times a Day., Disp: 20 capsule, Rfl: 0    predniSONE (DELTASONE) 20 MG tablet, Take 2 tablets by mouth Daily., Disp: 10 tablet, Rfl: 0   (Not in a hospital admission)     Allergies: Cefdinir, Levofloxacin, Sulfamethoxazole, Trimethoprim, and Penicillins    Physical Exam  Constitutional:       Appearance: Normal appearance.   HENT:      Right Ear: Tympanic membrane, ear canal and external ear normal.      Left Ear: Tympanic membrane, ear canal and external ear normal.      Nose: Congestion present.      Comments: Maxillary sinuses tender to palpation, right worse than left.     Mouth/Throat:      Mouth: Mucous membranes are moist.      Pharynx: Oropharynx is clear.   Cardiovascular:      Rate and Rhythm: Normal rate and regular rhythm.      Heart sounds: Normal heart sounds.   Pulmonary:      Effort: Pulmonary effort is normal. No respiratory distress.      Breath sounds: Normal breath  sounds. No wheezing, rhonchi or rales.   Neurological:      General: No focal deficit present.      Mental Status: She is alert and oriented to person, place, and time. Mental status is at baseline.   Psychiatric:         Mood and Affect: Mood normal.         Behavior: Behavior normal.         Thought Content: Thought content normal.         Judgment: Judgment normal.          Result Review :                   Assessment and Plan    Diagnoses and all orders for this visit:    1. Acute non-recurrent maxillary sinusitis (Primary)  Assessment & Plan:  Antibiotic as prescribed.  Prednisone as prescribed.  Avoid NSAIDs while on prednisone.  Continue with allergy meds and Flonase.  Fluids and rest encouraged.  Risk of meds discussed and understood.  Education provided.  Return in 2 days if no improvement, sooner if worsens.  Patient states she plans on following up in the near future for routine check-in.  Return to clinic or ED with any issues or concerns.    Orders:  -     doxycycline (VIBRAMYCIN) 100 MG capsule; Take 1 capsule by mouth 2 (Two) Times a Day.  Dispense: 20 capsule; Refill: 0  -     predniSONE (DELTASONE) 20 MG tablet; Take 2 tablets by mouth Daily.  Dispense: 10 tablet; Refill: 0                      Follow Up   Return if symptoms worsen or fail to improve.  Patient was given instructions and counseling regarding her condition or for health maintenance advice. Please see specific information pulled into the AVS if appropriate.     TOMASA Bethea

## 2024-08-28 ENCOUNTER — TELEPHONE (OUTPATIENT)
Dept: FAMILY MEDICINE CLINIC | Facility: CLINIC | Age: 64
End: 2024-08-28
Payer: COMMERCIAL

## 2024-08-28 NOTE — TELEPHONE ENCOUNTER
Name: Jory Taylor      Relationship: Self      Best Callback Number:  [phone]       HUB PROVIDED THE RELAY MESSAGE FROM THE OFFICE      PATIENT: VOICED UNDERSTANDING AND HAS NO FURTHER QUESTIONS AT THIS TIME    ADDITIONAL INFORMATION: PATIENT REQUESTS WE DON'T LEAVE MESSAGES ASKING HER TO CALL BACK. SHE STATED SHE NEEDS US TO LEAVE HER ENTIRE MESSAGE ABOUT WHY WE ARE CALLING. IT MAKES HER VERY ANXIOUS.  PLEASE NOTE THIS IN HER CHART?  THANK YOU

## 2024-09-15 DIAGNOSIS — F41.9 ANXIETY: ICD-10-CM

## 2024-09-16 RX ORDER — BUSPIRONE HYDROCHLORIDE 5 MG/1
5 TABLET ORAL 2 TIMES DAILY
Qty: 60 TABLET | Refills: 0 | Status: SHIPPED | OUTPATIENT
Start: 2024-09-16

## 2024-10-02 ENCOUNTER — OFFICE VISIT (OUTPATIENT)
Dept: FAMILY MEDICINE CLINIC | Facility: CLINIC | Age: 64
End: 2024-10-02
Payer: COMMERCIAL

## 2024-10-02 VITALS
BODY MASS INDEX: 28.61 KG/M2 | WEIGHT: 178 LBS | SYSTOLIC BLOOD PRESSURE: 124 MMHG | DIASTOLIC BLOOD PRESSURE: 82 MMHG | OXYGEN SATURATION: 98 % | HEART RATE: 73 BPM | HEIGHT: 66 IN

## 2024-10-02 DIAGNOSIS — R79.89 ELEVATED FERRITIN: ICD-10-CM

## 2024-10-02 DIAGNOSIS — E55.9 VITAMIN D DEFICIENCY: ICD-10-CM

## 2024-10-02 DIAGNOSIS — E11.9 TYPE 2 DIABETES MELLITUS WITHOUT COMPLICATION, WITHOUT LONG-TERM CURRENT USE OF INSULIN: ICD-10-CM

## 2024-10-02 DIAGNOSIS — E78.5 HYPERLIPIDEMIA LDL GOAL <100: Primary | ICD-10-CM

## 2024-10-02 DIAGNOSIS — Z12.11 SCREENING FOR COLON CANCER: ICD-10-CM

## 2024-10-02 DIAGNOSIS — Z72.0 TOBACCO USE: ICD-10-CM

## 2024-10-02 DIAGNOSIS — E56.9 VITAMIN DEFICIENCY: ICD-10-CM

## 2024-10-02 DIAGNOSIS — I10 ESSENTIAL HYPERTENSION: ICD-10-CM

## 2024-10-02 DIAGNOSIS — Z79.899 ENCOUNTER FOR LONG-TERM (CURRENT) USE OF MEDICATIONS: ICD-10-CM

## 2024-10-02 DIAGNOSIS — D72.829 LEUKOCYTOSIS, UNSPECIFIED TYPE: ICD-10-CM

## 2024-10-02 DIAGNOSIS — Z12.4 SCREENING FOR CERVICAL CANCER: ICD-10-CM

## 2024-10-02 PROCEDURE — 99214 OFFICE O/P EST MOD 30 MIN: CPT | Performed by: NURSE PRACTITIONER

## 2024-10-02 RX ORDER — ROSUVASTATIN CALCIUM 10 MG/1
10 TABLET, COATED ORAL DAILY
Qty: 90 TABLET | Refills: 1 | Status: SHIPPED | OUTPATIENT
Start: 2024-10-02

## 2024-10-02 RX ORDER — SEMAGLUTIDE 0.68 MG/ML
INJECTION, SOLUTION SUBCUTANEOUS
Qty: 3 ML | Refills: 2 | Status: SHIPPED | OUTPATIENT
Start: 2024-10-02

## 2024-10-02 RX ORDER — EZETIMIBE 10 MG/1
10 TABLET ORAL DAILY
COMMUNITY
Start: 2024-08-22

## 2024-10-02 RX ORDER — DAPAGLIFLOZIN 5 MG/1
5 TABLET, FILM COATED ORAL DAILY
Qty: 90 TABLET | Refills: 1 | Status: CANCELLED | OUTPATIENT
Start: 2024-10-02

## 2024-10-02 NOTE — ASSESSMENT & PLAN NOTE
continue medications.  Goal blood pressure less than 140/90.  Continue to follow-up with cardiology.  Proper diet and exercise plan discussed and encouraged.  Return to clinic or ED with any issues or concerns.

## 2024-10-02 NOTE — PROGRESS NOTES
"Chief Complaint  Diabetes and Hypertension    Subjective          Jory Taylor presents to [facility]  Diabetes  Pertinent negatives for diabetes include no fatigue.   Hypertension  Pertinent negatives include no shortness of breath.     History of Present Illness  The patient is a 64-year-old female here for follow-up of diabetes.    She reports that her current medication, Farxiga, has been causing weight gain and frequent urinary tract infections (UTIs). Despite these side effects, she is committed to managing her diabetes effectively. She maintains a healthy diet, avoiding soft drinks and junk food, and consumes unsweetened tea and water.     Her thyroid function is normal. She has not yet had her ferritin levels checked. Scheduled with Dr. Lopez next month for ferritin elevation. She is due for a Pap smear and mammogram but declines a colonoscopy. She regularly gets her eyes checked and reports no foot infections.     She has discontinued the use of BuSpar due to adverse reactions, including vomiting and headaches.     Doing well on current medications but wants to switch off of farxiga.     FAMILY HISTORY  She denies any family history of thyroid cancer. Ferritin elevation is common in her family.    Objective   Vital Signs:   /82   Pulse 73   Ht 167.6 cm (66\")   Wt 80.7 kg (178 lb)   SpO2 98%   BMI 28.73 kg/m²     Body mass index is 28.73 kg/m².    Review of Systems   Constitutional:  Negative for chills, fatigue and fever.   HENT:  Negative for congestion.    Eyes:  Negative for visual disturbance.   Respiratory:  Negative for cough, shortness of breath and wheezing.    Cardiovascular: Negative.    Gastrointestinal:  Negative for abdominal pain, diarrhea, nausea and vomiting.   Genitourinary:  Negative for decreased urine volume, dysuria, frequency, hematuria and urgency.   Musculoskeletal:  Negative for back pain.   Skin:  Negative for rash. "   Neurological:  Negative for headache.   Psychiatric/Behavioral: Negative.         Past History:  Medical History: has a past medical history of Acute non-recurrent maxillary sinusitis, Anxiety, Benign hypertension, BMI 30.0-30.9,adult, CAD (coronary artery disease), Chest pain, Cholelithiases, Diabetes, Dyslipidemia, Hypercholesterolemia, Irritable bowel disease, Migraine headache, and Tobacco use.   Surgical History: has a past surgical history that includes Cholecystectomy (1999); Tubal ligation (Bilateral, 2002); and Hysterectomy.   Family History: family history includes Breast cancer in her maternal aunt and maternal aunt; Breast cancer (age of onset: 81) in her mother; Coronary artery disease in her father; Diabetes in her father; Hypertension in her father; Migraines in her mother; Mitral valve prolapse in her mother; Stroke in her father.   Social History: reports that she has been smoking cigarettes. She started smoking about 43 years ago. She has a 21.9 pack-year smoking history. She has never used smokeless tobacco. She reports that she does not currently use alcohol. She reports that she does not use drugs.    PHQ-2 Depression Screening  Little interest or pleasure in doing things?     Feeling down, depressed, or hopeless?     PHQ-2 Total Score          PHQ-9 Depression Screening  Little interest or pleasure in doing things?     Feeling down, depressed, or hopeless?     Trouble falling or staying asleep, or sleeping too much?     Feeling tired or having little energy?     Poor appetite or overeating?     Feeling bad about yourself - or that you are a failure or have let yourself or your family down?     Trouble concentrating on things, such as reading the newspaper or watching television?     Moving or speaking so slowly that other people could have noticed? Or the opposite - being so fidgety or restless that you have been moving around a lot more than usual?     Thoughts that you would be better off  dead, or of hurting yourself in some way?     PHQ-9 Total Score     If you checked off any problems, how difficult have these problems made it for you to do your work, take care of things at home, or get along with other people?       PHQ-9 Total Score:        Patient screened positive for depression based on a PHQ-9 score of 0 on 6/25/2024. Follow-up recommendations include:          Current Outpatient Medications:     amLODIPine-valsartan (EXFORGE) 5-320 MG per tablet, Take 1 tablet by mouth Daily., Disp: 90 tablet, Rfl: 3    ezetimibe (ZETIA) 10 MG tablet, 1 tablet Daily., Disp: , Rfl:     nebivolol (BYSTOLIC) 10 MG tablet, Take 1 tablet by mouth Daily., Disp: 90 tablet, Rfl: 3    rosuvastatin (Crestor) 10 MG tablet, Take 1 tablet by mouth Daily., Disp: 90 tablet, Rfl: 1    Semaglutide,0.25 or 0.5MG/DOS, (Ozempic, 0.25 or 0.5 MG/DOSE,) 2 MG/3ML solution pen-injector, Start 0.25 mg SC qwk x 4 weeks then increase to 0.5 mg SC qwk, Disp: 3 mL, Rfl: 2   (Not in a hospital admission)     Allergies: Cefdinir, Levofloxacin, Sulfamethoxazole, Trimethoprim, and Penicillins    Physical Exam  Constitutional:       Appearance: Normal appearance.   Eyes:      Conjunctiva/sclera: Conjunctivae normal.      Pupils: Pupils are equal, round, and reactive to light.   Cardiovascular:      Rate and Rhythm: Normal rate and regular rhythm.      Heart sounds: Normal heart sounds.   Pulmonary:      Effort: Pulmonary effort is normal.      Breath sounds: Normal breath sounds.   Neurological:      General: No focal deficit present.      Mental Status: She is alert and oriented to person, place, and time. Mental status is at baseline.   Psychiatric:         Mood and Affect: Mood normal.         Behavior: Behavior normal.         Thought Content: Thought content normal.         Judgment: Judgment normal.        Physical Exam      Result Review :          Results               Assessment and Plan    Diagnoses and all orders for this  visit:    1. Hyperlipidemia LDL goal <100 (Primary)  -     rosuvastatin (Crestor) 10 MG tablet; Take 1 tablet by mouth Daily.  Dispense: 90 tablet; Refill: 1    2. Essential hypertension  Assessment & Plan:   continue medications.  Goal blood pressure less than 140/90.  Continue to follow-up with cardiology.  Proper diet and exercise plan discussed and encouraged.  Return to clinic or ED with any issues or concerns.      3. Vitamin D deficiency  -     Vitamin D,25-Hydroxy    4. Type 2 diabetes mellitus without complication, without long-term current use of insulin  -     Hemoglobin A1c  -     Semaglutide,0.25 or 0.5MG/DOS, (Ozempic, 0.25 or 0.5 MG/DOSE,) 2 MG/3ML solution pen-injector; Start 0.25 mg SC qwk x 4 weeks then increase to 0.5 mg SC qwk  Dispense: 3 mL; Refill: 2    5. Screening for colon cancer  -     Ambulatory Referral For Screening Colonoscopy    6. Screening for cervical cancer  -     Ambulatory Referral to Gynecology    7. Elevated ferritin  Assessment & Plan:  Follow-up with hematology Dr. Lopez as scheduled.     Orders:  -     Ferritin    8. Leukocytosis, unspecified type  Assessment & Plan:  Follow-up with hematology Dr. Lopez as scheduled.       9. Encounter for long-term (current) use of medications  -     CBC & Differential  -     Comprehensive Metabolic Panel    10. Tobacco use    11. Vitamin deficiency  -     Vitamin B12      Assessment & Plan  1. Diabetes Mellitus.  The patient reports weight gain and constant irritation while on Farxiga, including a history of UTIs. She will discontinue Farxiga and start Ozempic at 0.25 mg once a week for a month, then increase to 0.5 mg weekly. This regimen aims to control her diabetes without significant weight gain. Blood work will be done today to check A1c, liver, and kidney function. If her diabetes worsens, the Ozempic dosage will be gradually increased.  States no personal or family history of thyroid cancer.  Check feet daily.  Annual dental and  eye exams encouraged.  Risk discussed and understood.    2. Increased risk of hemochromatosis.  The patient has a family history of elevated ferritin levels. Blood work will be done today to check her ferritin levels.  She will follow-up with Dr. Lopez next month as scheduled.    3. Health Maintenance.  She will be referred for a Pap smear in [place] and a colonoscopy through [facility]. Her mammogram is up to date.  She denies all immunizations including flu pneumonia shingles tetanus COVID and understands the risks of not doing.  Proper diet exercise plan discussed and encouraged.  Risk of meds discussed understood.  Education provided.  Continue to follow-up with all specialist including her cardiologist and hematologist.  Return to clinic or ED with any issues or concerns.                      Follow Up   Return in about 6 months (around 4/2/2025).  Patient was given instructions and counseling regarding her condition or for health maintenance advice. Please see specific information pulled into the AVS if appropriate.     Patient or patient representative verbalized consent for the use of Ambient Listening during the visit with  TOMASA Bethea for chart documentation. 10/2/2024  08:18 EDT    TOMASA Bethea

## 2024-10-04 LAB
25(OH)D3+25(OH)D2 SERPL-MCNC: 31.4 NG/ML (ref 30–100)
ALBUMIN SERPL-MCNC: 4.7 G/DL (ref 3.9–4.9)
ALP SERPL-CCNC: 109 IU/L (ref 44–121)
ALT SERPL-CCNC: 19 IU/L (ref 0–32)
AST SERPL-CCNC: 29 IU/L (ref 0–40)
BASOPHILS # BLD AUTO: 0.1 X10E3/UL (ref 0–0.2)
BASOPHILS NFR BLD AUTO: 1 %
BILIRUB SERPL-MCNC: 0.3 MG/DL (ref 0–1.2)
BUN SERPL-MCNC: 17 MG/DL (ref 8–27)
BUN/CREAT SERPL: 19 (ref 12–28)
CALCIUM SERPL-MCNC: ABNORMAL MG/DL
CHLORIDE SERPL-SCNC: 111 MMOL/L (ref 96–106)
CO2 SERPL-SCNC: ABNORMAL MMOL/L
CREAT SERPL-MCNC: 0.88 MG/DL (ref 0.57–1)
EGFRCR SERPLBLD CKD-EPI 2021: 74 ML/MIN/1.73
EOSINOPHIL # BLD AUTO: 0.2 X10E3/UL (ref 0–0.4)
EOSINOPHIL NFR BLD AUTO: 1 %
ERYTHROCYTE [DISTWIDTH] IN BLOOD BY AUTOMATED COUNT: 13.5 % (ref 11.7–15.4)
FERRITIN SERPL-MCNC: NORMAL NG/ML
GLOBULIN SER CALC-MCNC: 2.8 G/DL (ref 1.5–4.5)
GLUCOSE SERPL-MCNC: ABNORMAL MG/DL
HBA1C MFR BLD: 6.7 % (ref 4.8–5.6)
HCT VFR BLD AUTO: 44.3 % (ref 34–46.6)
HGB BLD-MCNC: 14.3 G/DL (ref 11.1–15.9)
IMM GRANULOCYTES # BLD AUTO: 0 X10E3/UL (ref 0–0.1)
IMM GRANULOCYTES NFR BLD AUTO: 0 %
LYMPHOCYTES # BLD AUTO: 3.1 X10E3/UL (ref 0.7–3.1)
LYMPHOCYTES NFR BLD AUTO: 29 %
MCH RBC QN AUTO: 30.4 PG (ref 26.6–33)
MCHC RBC AUTO-ENTMCNC: 32.3 G/DL (ref 31.5–35.7)
MCV RBC AUTO: 94 FL (ref 79–97)
MONOCYTES # BLD AUTO: 0.8 X10E3/UL (ref 0.1–0.9)
MONOCYTES NFR BLD AUTO: 7 %
NEUTROPHILS # BLD AUTO: 6.7 X10E3/UL (ref 1.4–7)
NEUTROPHILS NFR BLD AUTO: 62 %
PLATELET # BLD AUTO: 244 X10E3/UL (ref 150–450)
POTASSIUM SERPL-SCNC: 4.9 MMOL/L (ref 3.5–5.2)
PROT SERPL-MCNC: 7.5 G/DL (ref 6–8.5)
RBC # BLD AUTO: 4.71 X10E6/UL (ref 3.77–5.28)
REQUEST PROBLEM: NORMAL
SODIUM SERPL-SCNC: 147 MMOL/L (ref 134–144)
VIT B12 SERPL-MCNC: 372 PG/ML (ref 232–1245)
WBC # BLD AUTO: 10.8 X10E3/UL (ref 3.4–10.8)

## 2024-10-16 DIAGNOSIS — Z79.899 ENCOUNTER FOR LONG-TERM (CURRENT) USE OF MEDICATIONS: Primary | ICD-10-CM

## 2024-10-16 DIAGNOSIS — R79.89 ELEVATED FERRITIN: ICD-10-CM

## 2024-10-17 ENCOUNTER — TELEPHONE (OUTPATIENT)
Dept: FAMILY MEDICINE CLINIC | Facility: CLINIC | Age: 64
End: 2024-10-17
Payer: COMMERCIAL

## 2024-10-17 NOTE — TELEPHONE ENCOUNTER
HUB TO RELAY    Please let patient know from labs her sodium level was high.  Cut back on salt/sodium intake and increase water intake.  For some reason the lab was unable to run a calcium level a glucose level and her ferritin level.  I will place those orders and recommend she come back to have those drawn.     A1c has remained stable at 6.7.  Continue current treatment plan and encourage healthy diet limiting intake of sugar sweets carbs starches and 30 minutes exercise most days of the week.     White blood cell count normal.  Vitamin D normal

## 2024-10-17 NOTE — TELEPHONE ENCOUNTER
Name: Jory Taylor    Relationship: Self    Best Callback Number: [phone]     HUB PROVIDED THE RELAY MESSAGE FROM THE OFFICE   PATIENT HAS FURTHER QUESTIONS AND WOULD LIKE A CALL BACK AT THE FOLLOWING PHONE NUMBER [phone]    ADDITIONAL INFORMATION:ADDITIONAL INFORMATION: THE PATIENT STATED THERE WERE ISSUES WHILE SHE WAS GETTING HER BLOOD DRAWN WITH THE  DOING THE DRAW. THE PATIENT WANTS TO MAKE SURE HER INSURANCE WILL PAY TO HAVE THIS REDONE, BECAUSE SHE HAS AN UPCOMING APPOINTMENT WITH HER HEMOTOLOGIST, WHERE SHE WILL HAVE LABS DONE THERE AS WELL. PLEASE CALL AND ADVISE.

## 2024-10-18 NOTE — TELEPHONE ENCOUNTER
Spoke to pt and advised I could inform [facility] to do a no charge for the stick but they would still process her labs because those were not processed through ins. Pt agreed and understood. I advised [place] maldonado/Arkamifarrukh to aroldo as no charge. I scheduled pt appt for Tues. 10/22 at 8:20am for labs.

## 2024-10-22 ENCOUNTER — LAB (OUTPATIENT)
Dept: FAMILY MEDICINE CLINIC | Facility: CLINIC | Age: 64
End: 2024-10-22
Payer: COMMERCIAL

## 2024-10-23 DIAGNOSIS — E83.52 HIGH CALCIUM LEVELS: Primary | ICD-10-CM

## 2024-10-23 LAB
BUN SERPL-MCNC: 17 MG/DL (ref 8–27)
BUN/CREAT SERPL: 21 (ref 12–28)
CALCIUM SERPL-MCNC: 10.6 MG/DL (ref 8.7–10.3)
CHLORIDE SERPL-SCNC: 105 MMOL/L (ref 96–106)
CO2 SERPL-SCNC: 22 MMOL/L (ref 20–29)
CREAT SERPL-MCNC: 0.81 MG/DL (ref 0.57–1)
EGFRCR SERPLBLD CKD-EPI 2021: 82 ML/MIN/1.73
FERRITIN SERPL-MCNC: 300 NG/ML (ref 15–150)
GLUCOSE SERPL-MCNC: 96 MG/DL (ref 70–99)
POTASSIUM SERPL-SCNC: 5.3 MMOL/L (ref 3.5–5.2)
SODIUM SERPL-SCNC: 145 MMOL/L (ref 134–144)

## 2024-10-27 DIAGNOSIS — E11.9 TYPE 2 DIABETES MELLITUS WITHOUT COMPLICATION, WITHOUT LONG-TERM CURRENT USE OF INSULIN: ICD-10-CM

## 2024-10-28 RX ORDER — DAPAGLIFLOZIN 5 MG/1
5 TABLET, FILM COATED ORAL DAILY
Qty: 30 TABLET | Refills: 3 | OUTPATIENT
Start: 2024-10-28

## 2024-10-31 ENCOUNTER — LAB (OUTPATIENT)
Dept: FAMILY MEDICINE CLINIC | Facility: CLINIC | Age: 64
End: 2024-10-31
Payer: COMMERCIAL

## 2024-11-01 LAB
BUN SERPL-MCNC: 21 MG/DL (ref 8–27)
BUN/CREAT SERPL: 29 (ref 12–28)
CALCIUM SERPL-MCNC: 9.9 MG/DL (ref 8.7–10.3)
CHLORIDE SERPL-SCNC: 103 MMOL/L (ref 96–106)
CO2 SERPL-SCNC: 21 MMOL/L (ref 20–29)
CREAT SERPL-MCNC: 0.73 MG/DL (ref 0.57–1)
EGFRCR SERPLBLD CKD-EPI 2021: 92 ML/MIN/1.73
GLUCOSE SERPL-MCNC: 105 MG/DL (ref 70–99)
POTASSIUM SERPL-SCNC: 5.2 MMOL/L (ref 3.5–5.2)
SODIUM SERPL-SCNC: 140 MMOL/L (ref 134–144)

## 2024-11-12 ENCOUNTER — CONSULT (OUTPATIENT)
Dept: ONCOLOGY | Facility: CLINIC | Age: 64
End: 2024-11-12
Payer: COMMERCIAL

## 2024-11-12 VITALS
BODY MASS INDEX: 27.48 KG/M2 | HEIGHT: 66 IN | WEIGHT: 171 LBS | OXYGEN SATURATION: 96 % | RESPIRATION RATE: 18 BRPM | TEMPERATURE: 97.8 F | HEART RATE: 109 BPM | DIASTOLIC BLOOD PRESSURE: 75 MMHG | SYSTOLIC BLOOD PRESSURE: 117 MMHG

## 2024-11-12 DIAGNOSIS — D72.828 OTHER ELEVATED WHITE BLOOD CELL (WBC) COUNT: ICD-10-CM

## 2024-11-12 DIAGNOSIS — R79.89 ELEVATED FERRITIN: Primary | ICD-10-CM

## 2024-11-12 PROCEDURE — 99204 OFFICE O/P NEW MOD 45 MIN: CPT | Performed by: INTERNAL MEDICINE

## 2024-11-12 NOTE — PROGRESS NOTES
CHIEF COMPLAINT: No somatic complaints other than chronic sinusitis and arthritis    REASON FOR REFERRAL: Elevated ferritin and iwhite count      RECORDS OBTAINED  Records of the patients history including those obtained from   primary care were reviewed and summarized in detail.    Oncology/Hematology History Overview Note   1.  Transient and intermittent mild leukocytosis with elevated ferritin with normal iron and no family history of hemochromatosis    -3/30/2021 white count 11,600 otherwise unremarkable CBC  -6/26/2023 white count 11,100 otherwise unremarkable CBC  -6/25/2024 ferritin 292.  Iron normal 88.  White count 12,200 otherwise unremarkable CBC.  -7/17/2024 ferritin 316.  White count 11,100 otherwise unremarkable CBC  -8/9/2024 low-dose CT chest lung RADS 2 benign appearing less than 1% chance of malignancy 4 mm micronodule left lower lobe with central punctate calcification and a couple of tiny calcified granuloma left lower lobe.  -10/22/2024 ferritin 300.  White count normal 10,800 upper limit of normal.  Unremarkable CBC.    -11/12/2024 [facility] hematology consult: Patient with very mild and ultimately transient leukocytosis with mild elevation of ferritin and prior normal iron.  Multiple inflammatory conditions including chronic sinusitis and arthritis are likely culprits.  Anything that can increase macrophages (which all contain ferritin molecules) can increase ferritin absent elevation of actual iron.  Hence ferritin is an acute phase reactant and as does the white count so we will usually go the ferritin.  I will check her HFE gene mutation for completeness but the odds of hemochromatosis are exceedingly unlikely and the odds of anything other than a mild inflammatory leukocytosis given the chronicity of this is exceedingly unlikely.  I will see her back in a few weeks to go over results.     Elevated ferritin       HISTORY OF PRESENT ILLNESS:  The patient is a 64 y.o.  female, referred for  elevated ferritin and white count with chronic sinusitis and arthritis.    REVIEW OF SYSTEMS:  No somatic complaints    Past Medical History:   Diagnosis Date    Acute non-recurrent maxillary sinusitis     Anxiety     Benign hypertension     BMI 30.0-30.9,adult     CAD (coronary artery disease)     Chest pain     Cholelithiases     Diabetes     Dyslipidemia     Hypercholesterolemia     Irritable bowel disease     Migraine headache     Tobacco use      Past Surgical History:   Procedure Laterality Date    CHOLECYSTECTOMY  1999    HYSTERECTOMY      TUBAL ABDOMINAL LIGATION Bilateral 2002       Current Outpatient Medications on File Prior to Visit   Medication Sig Dispense Refill    amLODIPine-valsartan (EXFORGE) 5-320 MG per tablet Take 1 tablet by mouth Daily. 90 tablet 3    ezetimibe (ZETIA) 10 MG tablet 1 tablet Daily.      nebivolol (BYSTOLIC) 10 MG tablet Take 1 tablet by mouth Daily. 90 tablet 3    rosuvastatin (Crestor) 10 MG tablet Take 1 tablet by mouth Daily. 90 tablet 1    Semaglutide,0.25 or 0.5MG/DOS, (Ozempic, 0.25 or 0.5 MG/DOSE,) 2 MG/3ML solution pen-injector Start 0.25 mg SC qwk x 4 weeks then increase to 0.5 mg SC qwk 3 mL 2     No current facility-administered medications on file prior to visit.       Allergies   Allergen Reactions    Cefdinir Unknown - High Severity    Levofloxacin Unknown - High Severity    Sulfamethoxazole Unknown - High Severity    Trimethoprim Unknown - High Severity    Penicillins Rash       Social History     Socioeconomic History    Marital status: Single   Tobacco Use    Smoking status: Every Day     Current packs/day: 0.50     Average packs/day: 0.5 packs/day for 43.9 years (21.9 ttl pk-yrs)     Types: Cigarettes     Start date: 1981    Smokeless tobacco: Never   Vaping Use    Vaping status: Never Used   Substance and Sexual Activity    Alcohol use: Not Currently    Drug use: Never    Sexual activity: Defer       Family History   Problem Relation Age of Onset    Breast  "cancer Mother 81    Mitral valve prolapse Mother     Migraines Mother     Diabetes Father     Stroke Father     Hypertension Father     Coronary artery disease Father     Breast cancer Maternal Aunt         unknown ? 40's    Breast cancer Maternal Aunt         2 great aunts unknown    Ovarian cancer Neg Hx        PHYSICAL EXAM:  No jaundice icterus or pallor.  No noman synovitis or rashes.  No palpable adenopathy.    /75   Pulse 109   Temp 97.8 °F (36.6 °C)   Resp 18   Ht 167.6 cm (66\")   Wt 77.6 kg (171 lb)   SpO2 96%   BMI 27.60 kg/m²     ECOG score: 0           ECOG: (0) Fully Active - Able to Carry On All Pre-disease Performance Without Restriction    Lab Results   Component Value Date    HGB 14.3 10/02/2024    HCT 44.3 10/02/2024    MCV 94 10/02/2024     10/02/2024    WBC 10.8 10/02/2024    NEUTROABS 6.7 10/02/2024    LYMPHSABS 3.1 10/02/2024    MONOSABS 0.8 10/02/2024    EOSABS 0.2 10/02/2024    BASOSABS 0.1 10/02/2024     Lab Results   Component Value Date    GLUCOSE 105 (H) 10/31/2024    BUN 21 10/31/2024    CREATININE 0.73 10/31/2024     10/31/2024    K 5.2 10/31/2024     10/31/2024    CO2 21 10/31/2024    CALCIUM 9.9 10/31/2024    ALBUMIN 4.7 10/02/2024    BILITOT 0.3 10/02/2024    ALKPHOS 109 10/02/2024    AST 29 10/02/2024    ALT 19 10/02/2024         Assessment & Plan   1.  Transient and intermittent mild leukocytosis with elevated ferritin with normal iron and no family history of hemochromatosis    -3/30/2021 white count 11,600 otherwise unremarkable CBC  -6/26/2023 white count 11,100 otherwise unremarkable CBC  -6/25/2024 ferritin 292.  Iron normal 88.  White count 12,200 otherwise unremarkable CBC.  -7/17/2024 ferritin 316.  White count 11,100 otherwise unremarkable CBC  -8/9/2024 low-dose CT chest lung RADS 2 benign appearing less than 1% chance of malignancy 4 mm micronodule left lower lobe with central punctate calcification and a couple of tiny calcified granuloma " left lower lobe.  -10/22/2024 ferritin 300.  White count normal 10,800 upper limit of normal.  Unremarkable CBC.     -11/12/2024 [facility] hematology consult: Patient with very mild and ultimately transient leukocytosis with mild elevation of ferritin and prior normal iron.  Multiple inflammatory conditions including chronic sinusitis and arthritis are likely culprits.  Anything that can increase macrophages (which all contain ferritin molecules) can increase ferritin absent elevation of actual iron.  Hence ferritin is an acute phase reactant and as does the white count so we will usually go the ferritin.  I will check her HFE gene mutation for completeness but the odds of hemochromatosis are exceedingly unlikely and the odds of anything other than a mild inflammatory leukocytosis given the chronicity of this is exceedingly unlikely.  I will see her back in a few weeks to go over results.    Total time of care today inclusive of time spent today prior to patient's arrival reviewing past records and cataloging as above and during visit interviewing as to signs or symptoms of the potential causes and consequences of mild leukocytosis and mild elevation of ferritin absent elevation of iron and after visit instituting plans as outlined above took 35 minutes patient care time throughout the day today.  Dayne Lopez MD    11/12/2024

## 2024-11-12 NOTE — LETTER
November 12, 2024     TOMASA Bethea  [address]  [place] [address]    Patient: Jory Taylor   YOB: 1960   Date of Visit: 11/12/2024     Dear TOMASA Bethea:       Thank you for referring Jory Taylor to me for evaluation. Below are the relevant portions of my assessment and plan of care.    If you have questions, please do not hesitate to call me. I look forward to following Jory along with you.         Sincerely,        Dayne Lopez MD        CC: MD Jessica Briceño Lee G, MD  11/12/24 1132  Sign when Signing Visit  CHIEF COMPLAINT: No somatic complaints other than chronic sinusitis and arthritis    REASON FOR REFERRAL: Elevated ferritin and iwhite count      RECORDS OBTAINED  Records of the patients history including those obtained from   primary care were reviewed and summarized in detail.    Oncology/Hematology History Overview Note   1.  Transient and intermittent mild leukocytosis with elevated ferritin with normal iron and no family history of hemochromatosis    -3/30/2021 white count 11,600 otherwise unremarkable CBC  -6/26/2023 white count 11,100 otherwise unremarkable CBC  -6/25/2024 ferritin 292.  Iron normal 88.  White count 12,200 otherwise unremarkable CBC.  -7/17/2024 ferritin 316.  White count 11,100 otherwise unremarkable CBC  -8/9/2024 low-dose CT chest lung RADS 2 benign appearing less than 1% chance of malignancy 4 mm micronodule left lower lobe with central punctate calcification and a couple of tiny calcified granuloma left lower lobe.  -10/22/2024 ferritin 300.  White count normal 10,800 upper limit of normal.  Unremarkable CBC.    -11/12/2024 [facility] hematology consult: Patient with very mild and ultimately transient leukocytosis with mild elevation of ferritin and prior normal iron.  Multiple inflammatory conditions including chronic sinusitis and arthritis are likely culprits.  Anything that can increase  macrophages (which all contain ferritin molecules) can increase ferritin absent elevation of actual iron.  Hence ferritin is an acute phase reactant and as does the white count so we will usually go the ferritin.  I will check her HFE gene mutation for completeness but the odds of hemochromatosis are exceedingly unlikely and the odds of anything other than a mild inflammatory leukocytosis given the chronicity of this is exceedingly unlikely.  I will see her back in a few weeks to go over results.     Elevated ferritin       HISTORY OF PRESENT ILLNESS:  The patient is a 64 y.o.  female, referred for elevated ferritin and white count with chronic sinusitis and arthritis.    REVIEW OF SYSTEMS:  No somatic complaints    Past Medical History:   Diagnosis Date   • Acute non-recurrent maxillary sinusitis    • Anxiety    • Benign hypertension    • BMI 30.0-30.9,adult    • CAD (coronary artery disease)    • Chest pain    • Cholelithiases    • Diabetes    • Dyslipidemia    • Hypercholesterolemia    • Irritable bowel disease    • Migraine headache    • Tobacco use      Past Surgical History:   Procedure Laterality Date   • CHOLECYSTECTOMY  1999   • HYSTERECTOMY     • TUBAL ABDOMINAL LIGATION Bilateral 2002       Current Outpatient Medications on File Prior to Visit   Medication Sig Dispense Refill   • amLODIPine-valsartan (EXFORGE) 5-320 MG per tablet Take 1 tablet by mouth Daily. 90 tablet 3   • ezetimibe (ZETIA) 10 MG tablet 1 tablet Daily.     • nebivolol (BYSTOLIC) 10 MG tablet Take 1 tablet by mouth Daily. 90 tablet 3   • rosuvastatin (Crestor) 10 MG tablet Take 1 tablet by mouth Daily. 90 tablet 1   • Semaglutide,0.25 or 0.5MG/DOS, (Ozempic, 0.25 or 0.5 MG/DOSE,) 2 MG/3ML solution pen-injector Start 0.25 mg SC qwk x 4 weeks then increase to 0.5 mg SC qwk 3 mL 2     No current facility-administered medications on file prior to visit.       Allergies   Allergen Reactions   • Cefdinir Unknown - High Severity   •  "Levofloxacin Unknown - High Severity   • Sulfamethoxazole Unknown - High Severity   • Trimethoprim Unknown - High Severity   • Penicillins Rash       Social History     Socioeconomic History   • Marital status: Single   Tobacco Use   • Smoking status: Every Day     Current packs/day: 0.50     Average packs/day: 0.5 packs/day for 43.9 years (21.9 ttl pk-yrs)     Types: Cigarettes     Start date: 1981   • Smokeless tobacco: Never   Vaping Use   • Vaping status: Never Used   Substance and Sexual Activity   • Alcohol use: Not Currently   • Drug use: Never   • Sexual activity: Defer       Family History   Problem Relation Age of Onset   • Breast cancer Mother 81   • Mitral valve prolapse Mother    • Migraines Mother    • Diabetes Father    • Stroke Father    • Hypertension Father    • Coronary artery disease Father    • Breast cancer Maternal Aunt         unknown ? 40's   • Breast cancer Maternal Aunt         2 great aunts unknown   • Ovarian cancer Neg Hx        PHYSICAL EXAM:  No jaundice icterus or pallor.  No noman synovitis or rashes.  No palpable adenopathy.    /75   Pulse 109   Temp 97.8 °F (36.6 °C)   Resp 18   Ht 167.6 cm (66\")   Wt 77.6 kg (171 lb)   SpO2 96%   BMI 27.60 kg/m²     ECOG score: 0           ECOG: (0) Fully Active - Able to Carry On All Pre-disease Performance Without Restriction    Lab Results   Component Value Date    HGB 14.3 10/02/2024    HCT 44.3 10/02/2024    MCV 94 10/02/2024     10/02/2024    WBC 10.8 10/02/2024    NEUTROABS 6.7 10/02/2024    LYMPHSABS 3.1 10/02/2024    MONOSABS 0.8 10/02/2024    EOSABS 0.2 10/02/2024    BASOSABS 0.1 10/02/2024     Lab Results   Component Value Date    GLUCOSE 105 (H) 10/31/2024    BUN 21 10/31/2024    CREATININE 0.73 10/31/2024     10/31/2024    K 5.2 10/31/2024     10/31/2024    CO2 21 10/31/2024    CALCIUM 9.9 10/31/2024    ALBUMIN 4.7 10/02/2024    BILITOT 0.3 10/02/2024    ALKPHOS 109 10/02/2024    AST 29 10/02/2024    " ALT 19 10/02/2024         Assessment & Plan  1.  Transient and intermittent mild leukocytosis with elevated ferritin with normal iron and no family history of hemochromatosis    -3/30/2021 white count 11,600 otherwise unremarkable CBC  -6/26/2023 white count 11,100 otherwise unremarkable CBC  -6/25/2024 ferritin 292.  Iron normal 88.  White count 12,200 otherwise unremarkable CBC.  -7/17/2024 ferritin 316.  White count 11,100 otherwise unremarkable CBC  -8/9/2024 low-dose CT chest lung RADS 2 benign appearing less than 1% chance of malignancy 4 mm micronodule left lower lobe with central punctate calcification and a couple of tiny calcified granuloma left lower lobe.  -10/22/2024 ferritin 300.  White count normal 10,800 upper limit of normal.  Unremarkable CBC.     -11/12/2024 [facility] hematology consult: Patient with very mild and ultimately transient leukocytosis with mild elevation of ferritin and prior normal iron.  Multiple inflammatory conditions including chronic sinusitis and arthritis are likely culprits.  Anything that can increase macrophages (which all contain ferritin molecules) can increase ferritin absent elevation of actual iron.  Hence ferritin is an acute phase reactant and as does the white count so we will usually go the ferritin.  I will check her HFE gene mutation for completeness but the odds of hemochromatosis are exceedingly unlikely and the odds of anything other than a mild inflammatory leukocytosis given the chronicity of this is exceedingly unlikely.  I will see her back in a few weeks to go over results.    Total time of care today inclusive of time spent today prior to patient's arrival reviewing past records and cataloging as above and during visit interviewing as to signs or symptoms of the potential causes and consequences of mild leukocytosis and mild elevation of ferritin absent elevation of iron and after visit instituting plans as outlined above took 35 minutes patient care time  throughout the day today.  Dayne Lopez MD    11/12/2024

## 2024-11-14 ENCOUNTER — LAB (OUTPATIENT)
Dept: LAB | Facility: HOSPITAL | Age: 64
End: 2024-11-14
Payer: COMMERCIAL

## 2024-11-14 DIAGNOSIS — R79.89 ELEVATED FERRITIN: ICD-10-CM

## 2024-11-14 DIAGNOSIS — D72.828 OTHER ELEVATED WHITE BLOOD CELL (WBC) COUNT: ICD-10-CM

## 2024-11-14 LAB
BASOPHILS # BLD AUTO: 0.1 10*3/MM3 (ref 0–0.2)
BASOPHILS NFR BLD AUTO: 0.6 % (ref 0–1.5)
CRP SERPL-MCNC: <0.3 MG/DL (ref 0–0.5)
DEPRECATED RDW RBC AUTO: 44.8 FL (ref 37–54)
EOSINOPHIL # BLD AUTO: 0.14 10*3/MM3 (ref 0–0.4)
EOSINOPHIL NFR BLD AUTO: 0.9 % (ref 0.3–6.2)
ERYTHROCYTE [DISTWIDTH] IN BLOOD BY AUTOMATED COUNT: 13.1 % (ref 12.3–15.4)
ERYTHROCYTE [SEDIMENTATION RATE] IN BLOOD: 30 MM/HR (ref 0–30)
FERRITIN SERPL-MCNC: 303 NG/ML (ref 13–150)
HCT VFR BLD AUTO: 45.8 % (ref 34–46.6)
HGB BLD-MCNC: 14.8 G/DL (ref 12–15.9)
IMM GRANULOCYTES # BLD AUTO: 0.09 10*3/MM3 (ref 0–0.05)
IMM GRANULOCYTES NFR BLD AUTO: 0.6 % (ref 0–0.5)
IRON 24H UR-MRATE: 67 MCG/DL (ref 37–145)
IRON SATN MFR SERPL: 17 % (ref 20–50)
LYMPHOCYTES # BLD AUTO: 3.34 10*3/MM3 (ref 0.7–3.1)
LYMPHOCYTES NFR BLD AUTO: 21.4 % (ref 19.6–45.3)
MCH RBC QN AUTO: 30.1 PG (ref 26.6–33)
MCHC RBC AUTO-ENTMCNC: 32.3 G/DL (ref 31.5–35.7)
MCV RBC AUTO: 93.3 FL (ref 79–97)
MONOCYTES # BLD AUTO: 0.95 10*3/MM3 (ref 0.1–0.9)
MONOCYTES NFR BLD AUTO: 6.1 % (ref 5–12)
NEUTROPHILS NFR BLD AUTO: 11.01 10*3/MM3 (ref 1.7–7)
NEUTROPHILS NFR BLD AUTO: 70.4 % (ref 42.7–76)
NRBC BLD AUTO-RTO: 0 /100 WBC (ref 0–0.2)
PLATELET # BLD AUTO: 286 10*3/MM3 (ref 140–450)
PMV BLD AUTO: 10.8 FL (ref 6–12)
RBC # BLD AUTO: 4.91 10*6/MM3 (ref 3.77–5.28)
TIBC SERPL-MCNC: 386 MCG/DL (ref 298–536)
TRANSFERRIN SERPL-MCNC: 259 MG/DL (ref 200–360)
WBC NRBC COR # BLD AUTO: 15.63 10*3/MM3 (ref 3.4–10.8)

## 2024-11-14 PROCEDURE — 82728 ASSAY OF FERRITIN: CPT

## 2024-11-14 PROCEDURE — 85025 COMPLETE CBC W/AUTO DIFF WBC: CPT

## 2024-11-14 PROCEDURE — 36415 COLL VENOUS BLD VENIPUNCTURE: CPT

## 2024-11-14 PROCEDURE — 86140 C-REACTIVE PROTEIN: CPT

## 2024-11-14 PROCEDURE — 84466 ASSAY OF TRANSFERRIN: CPT

## 2024-11-14 PROCEDURE — 85652 RBC SED RATE AUTOMATED: CPT

## 2024-11-14 PROCEDURE — 81256 HFE GENE: CPT

## 2024-11-14 PROCEDURE — 83540 ASSAY OF IRON: CPT

## 2024-11-19 ENCOUNTER — TELEPHONE (OUTPATIENT)
Dept: FAMILY MEDICINE CLINIC | Facility: CLINIC | Age: 64
End: 2024-11-19

## 2024-11-19 NOTE — TELEPHONE ENCOUNTER
Caller: Jory Taylor    Relationship: Self    Best call back number: [phone]     What medication are you requesting: DOXYCYCLINE, PREDNISONE    What are your current symptoms: CONGESTION, COUGH, PRESSURE, SINUS SYMPTOMS    How long have you been experiencing symptoms: A FEW DAYS    Have you had these symptoms before:    [x] Yes  [] No    Have you been treated for these symptoms before:   [x] Yes  [] No    If a prescription is needed, what is your preferred pharmacy and phone number:  [id number] - [place] [address] - [phone] [facility] [phone]  [phone]

## 2024-11-22 ENCOUNTER — OFFICE VISIT (OUTPATIENT)
Dept: FAMILY MEDICINE CLINIC | Facility: CLINIC | Age: 64
End: 2024-11-22
Payer: COMMERCIAL

## 2024-11-22 VITALS
HEART RATE: 90 BPM | HEIGHT: 66 IN | BODY MASS INDEX: 27.16 KG/M2 | DIASTOLIC BLOOD PRESSURE: 86 MMHG | TEMPERATURE: 97.9 F | WEIGHT: 169 LBS | OXYGEN SATURATION: 97 % | SYSTOLIC BLOOD PRESSURE: 130 MMHG

## 2024-11-22 DIAGNOSIS — J01.00 ACUTE NON-RECURRENT MAXILLARY SINUSITIS: Primary | ICD-10-CM

## 2024-11-22 PROCEDURE — 99213 OFFICE O/P EST LOW 20 MIN: CPT | Performed by: NURSE PRACTITIONER

## 2024-11-22 RX ORDER — DOXYCYCLINE 100 MG/1
100 CAPSULE ORAL 2 TIMES DAILY
Qty: 20 CAPSULE | Refills: 0 | Status: SHIPPED | OUTPATIENT
Start: 2024-11-22

## 2024-11-22 RX ORDER — PREDNISONE 20 MG/1
40 TABLET ORAL DAILY
Qty: 10 TABLET | Refills: 0 | Status: SHIPPED | OUTPATIENT
Start: 2024-11-22

## 2024-11-22 RX ORDER — DEXTROMETHORPHAN HYDROBROMIDE AND PROMETHAZINE HYDROCHLORIDE 15; 6.25 MG/5ML; MG/5ML
5 SYRUP ORAL 4 TIMES DAILY PRN
Qty: 120 ML | Refills: 0 | Status: SHIPPED | OUTPATIENT
Start: 2024-11-22

## 2024-11-22 NOTE — PROGRESS NOTES
"Chief Complaint  Nasal Congestion    Subjective          Jory Kevin Taylor presents to [facility]  History of Present Illness  History of Present Illness  The patient is a 64-year-old female who presents for evaluation of cough and congestion.    She has been experiencing a cough for approximately a week. Her symptoms are more pronounced at night, with the cough producing non-green, non-clear phlegm. She also reports a runny nose and watery eyes. She had been using Flonase but discontinued it when her symptoms improved.  No fever no chills no bodyaches.  No sick contacts that she is aware of.  States maxillary sinuses have pressure and congestion.    She recently saw Dr. Lopez, who conducted some blood work. He explained the ferritin results to her and is not very concerned. She has a follow-up appointment with him on 12/03/2024 to review the results.          Objective   Vital Signs:   /86   Pulse 90   Temp 97.9 °F (36.6 °C)   Ht 167.6 cm (66\")   Wt 76.7 kg (169 lb)   SpO2 97%   BMI 27.28 kg/m²     Body mass index is 27.28 kg/m².    Review of Systems   Constitutional:  Negative for chills, fatigue and fever.   HENT:  Positive for congestion, postnasal drip, rhinorrhea and sinus pressure. Negative for ear pain, sneezing, sore throat, swollen glands and trouble swallowing.    Eyes:  Negative for visual disturbance.   Respiratory:  Positive for cough. Negative for shortness of breath and wheezing.    Cardiovascular:  Negative for chest pain.   Gastrointestinal:  Negative for abdominal pain, constipation, diarrhea, nausea and vomiting.   Genitourinary:  Negative for dysuria.   Musculoskeletal:  Negative for back pain.   Skin:  Negative for rash and wound.   Neurological:  Negative for headache.       Past History:  Medical History: has a past medical history of Acute non-recurrent maxillary sinusitis, Anxiety, Benign hypertension, BMI 30.0-30.9,adult, CAD (coronary " artery disease), Chest pain, Cholelithiases, Diabetes, Dyslipidemia, Hypercholesterolemia, Irritable bowel disease, Migraine headache, and Tobacco use.   Surgical History: has a past surgical history that includes Cholecystectomy (1999); Tubal ligation (Bilateral, 2002); and Hysterectomy.   Family History: family history includes Breast cancer in her maternal aunt and maternal aunt; Breast cancer (age of onset: 81) in her mother; Coronary artery disease in her father; Diabetes in her father; Hypertension in her father; Migraines in her mother; Mitral valve prolapse in her mother; Stroke in her father.   Social History: reports that she has been smoking cigarettes. She started smoking about 43 years ago. She has a 21.9 pack-year smoking history. She has never used smokeless tobacco. She reports that she does not currently use alcohol. She reports that she does not use drugs.    PHQ-2 Depression Screening  Little interest or pleasure in doing things?     Feeling down, depressed, or hopeless?     PHQ-2 Total Score         PHQ-9 Depression Screening  Little interest or pleasure in doing things?     Feeling down, depressed, or hopeless?     PHQ-2 Total Score     Trouble falling or staying asleep, or sleeping too much?     Feeling tired or having little energy?     Poor appetite or overeating?     Feeling bad about yourself - or that you are a failure or have let yourself or your family down?     Trouble concentrating on things, such as reading the newspaper or watching television?     Moving or speaking so slowly that other people could have noticed? Or the opposite - being so fidgety or restless that you have been moving around a lot more than usual?     Thoughts that you would be better off dead, or of hurting yourself in some way?     PHQ-9 Total Score     If you checked off any problems, how difficult have these problems made it for you to do your work, take care of things at home, or get along with other people?           PHQ-9 Total Score:        Patient screened positive for depression based on a PHQ-9 score of  on . Follow-up recommendations include:          Current Outpatient Medications:     amLODIPine-valsartan (EXFORGE) 5-320 MG per tablet, Take 1 tablet by mouth Daily., Disp: 90 tablet, Rfl: 3    ezetimibe (ZETIA) 10 MG tablet, 1 tablet Daily., Disp: , Rfl:     nebivolol (BYSTOLIC) 10 MG tablet, Take 1 tablet by mouth Daily., Disp: 90 tablet, Rfl: 3    rosuvastatin (Crestor) 10 MG tablet, Take 1 tablet by mouth Daily., Disp: 90 tablet, Rfl: 1    Semaglutide,0.25 or 0.5MG/DOS, (Ozempic, 0.25 or 0.5 MG/DOSE,) 2 MG/3ML solution pen-injector, Start 0.25 mg SC qwk x 4 weeks then increase to 0.5 mg SC qwk (Patient taking differently: 0.5 mg. Start 0.25 mg SC qwk x 4 weeks then increase to 0.5 mg SC qwk), Disp: 3 mL, Rfl: 2    doxycycline (VIBRAMYCIN) 100 MG capsule, Take 1 capsule by mouth 2 (Two) Times a Day., Disp: 20 capsule, Rfl: 0    predniSONE (DELTASONE) 20 MG tablet, Take 2 tablets by mouth Daily., Disp: 10 tablet, Rfl: 0    promethazine-dextromethorphan (PROMETHAZINE-DM) 6.25-15 MG/5ML syrup, Take 5 mL by mouth 4 (Four) Times a Day As Needed for Cough., Disp: 120 mL, Rfl: 0   (Not in a hospital admission)     Allergies: Cefdinir, Levofloxacin, Sulfamethoxazole, Trimethoprim, and Penicillins    Physical Exam  Constitutional:       Appearance: Normal appearance.   HENT:      Right Ear: Tympanic membrane, ear canal and external ear normal.      Left Ear: Tympanic membrane, ear canal and external ear normal.      Nose: Congestion present.      Comments: Maxillary sinuses tender bilaterally     Mouth/Throat:      Mouth: Mucous membranes are moist.      Pharynx: Oropharynx is clear.   Cardiovascular:      Rate and Rhythm: Normal rate and regular rhythm.      Heart sounds: Normal heart sounds.   Pulmonary:      Effort: Pulmonary effort is normal. No respiratory distress.      Breath sounds: Normal breath sounds. No  wheezing, rhonchi or rales.   Skin:     General: Skin is warm.      Findings: No erythema or rash.   Neurological:      General: No focal deficit present.      Mental Status: She is alert and oriented to person, place, and time. Mental status is at baseline.   Psychiatric:         Mood and Affect: Mood normal.         Behavior: Behavior normal.         Thought Content: Thought content normal.         Judgment: Judgment normal.        Physical Exam  Lungs sound good.    Result Review :          Results  Laboratory Studies  Ferritin level stable at 300. Iron levels were normal. Iron saturation was slightly low at 17. White blood cell count was up to 15.             Assessment and Plan    Diagnoses and all orders for this visit:    1. Acute non-recurrent maxillary sinusitis (Primary)  -     predniSONE (DELTASONE) 20 MG tablet; Take 2 tablets by mouth Daily.  Dispense: 10 tablet; Refill: 0  -     doxycycline (VIBRAMYCIN) 100 MG capsule; Take 1 capsule by mouth 2 (Two) Times a Day.  Dispense: 20 capsule; Refill: 0  -     promethazine-dextromethorphan (PROMETHAZINE-DM) 6.25-15 MG/5ML syrup; Take 5 mL by mouth 4 (Four) Times a Day As Needed for Cough.  Dispense: 120 mL; Refill: 0      Assessment & Plan  1. Cough and congestion.  A prescription for steroids, an antibiotic, and cough medicine will be provided (monitor for sedation). She is advised to continue using the nasal spray and to add Claritin daily for the next month to help with drainage.  Tylenol as needed for pain fever.  Patient states that usually takes antibiotic and steroids to clear this up.  Fluids and rest encouraged.  Risk of meds discussed and understood.  Return in 2 days if no improvement, sooner if worsens.  Return to clinic or ED with any issues or concerns.        Follow-up  Return in a couple of months for lab recheck and A1c monitoring.                    Follow Up   Return if symptoms worsen or fail to improve.  Patient was given instructions and  counseling regarding her condition or for health maintenance advice. Please see specific information pulled into the AVS if appropriate.     Patient or patient representative verbalized consent for the use of Ambient Listening during the visit with  TOMASA Bethea for chart documentation. 11/22/2024  09:42 EST    TOMASA Bethea

## 2024-11-25 LAB
HFE GENE MUT ANL BLD/T: NORMAL
IMP & REVIEW OF LAB RESULTS: NORMAL

## 2024-12-03 ENCOUNTER — OFFICE VISIT (OUTPATIENT)
Dept: ONCOLOGY | Facility: CLINIC | Age: 64
End: 2024-12-03
Payer: COMMERCIAL

## 2024-12-03 ENCOUNTER — LAB (OUTPATIENT)
Dept: LAB | Facility: HOSPITAL | Age: 64
End: 2024-12-03
Payer: COMMERCIAL

## 2024-12-03 VITALS
BODY MASS INDEX: 27.97 KG/M2 | TEMPERATURE: 97 F | OXYGEN SATURATION: 98 % | WEIGHT: 174 LBS | HEIGHT: 66 IN | DIASTOLIC BLOOD PRESSURE: 74 MMHG | RESPIRATION RATE: 18 BRPM | HEART RATE: 97 BPM | SYSTOLIC BLOOD PRESSURE: 121 MMHG

## 2024-12-03 DIAGNOSIS — R79.89 ELEVATED FERRITIN: Primary | ICD-10-CM

## 2024-12-03 DIAGNOSIS — D72.828 OTHER ELEVATED WHITE BLOOD CELL (WBC) COUNT: ICD-10-CM

## 2024-12-03 LAB
BASOPHILS # BLD AUTO: 0.05 10*3/MM3 (ref 0–0.2)
BASOPHILS NFR BLD AUTO: 0.4 % (ref 0–1.5)
DEPRECATED RDW RBC AUTO: 45.6 FL (ref 37–54)
EOSINOPHIL # BLD AUTO: 0.2 10*3/MM3 (ref 0–0.4)
EOSINOPHIL NFR BLD AUTO: 1.6 % (ref 0.3–6.2)
ERYTHROCYTE [DISTWIDTH] IN BLOOD BY AUTOMATED COUNT: 13.3 % (ref 12.3–15.4)
HCT VFR BLD AUTO: 42 % (ref 34–46.6)
HGB BLD-MCNC: 13.9 G/DL (ref 12–15.9)
IMM GRANULOCYTES # BLD AUTO: 0.06 10*3/MM3 (ref 0–0.05)
IMM GRANULOCYTES NFR BLD AUTO: 0.5 % (ref 0–0.5)
LYMPHOCYTES # BLD AUTO: 3.39 10*3/MM3 (ref 0.7–3.1)
LYMPHOCYTES NFR BLD AUTO: 27.3 % (ref 19.6–45.3)
MCH RBC QN AUTO: 30.7 PG (ref 26.6–33)
MCHC RBC AUTO-ENTMCNC: 33.1 G/DL (ref 31.5–35.7)
MCV RBC AUTO: 92.7 FL (ref 79–97)
MONOCYTES # BLD AUTO: 0.93 10*3/MM3 (ref 0.1–0.9)
MONOCYTES NFR BLD AUTO: 7.5 % (ref 5–12)
NEUTROPHILS NFR BLD AUTO: 62.7 % (ref 42.7–76)
NEUTROPHILS NFR BLD AUTO: 7.79 10*3/MM3 (ref 1.7–7)
NRBC BLD AUTO-RTO: 0 /100 WBC (ref 0–0.2)
PLATELET # BLD AUTO: 234 10*3/MM3 (ref 140–450)
PMV BLD AUTO: 10.1 FL (ref 6–12)
RBC # BLD AUTO: 4.53 10*6/MM3 (ref 3.77–5.28)
WBC NRBC COR # BLD AUTO: 12.42 10*3/MM3 (ref 3.4–10.8)

## 2024-12-03 PROCEDURE — 85025 COMPLETE CBC W/AUTO DIFF WBC: CPT

## 2024-12-03 PROCEDURE — 36415 COLL VENOUS BLD VENIPUNCTURE: CPT

## 2024-12-03 NOTE — LETTER
December 3, 2024     TOMASA Bethea  [address]  [place] [address]    Patient: Jory Taylor   YOB: 1960   Date of Visit: 12/3/2024     Dear TOMASA Bethea:       Thank you for referring Jory Taylor to me for evaluation. Below are the relevant portions of my assessment and plan of care.    If you have questions, please do not hesitate to call me. I look forward to following Jory along with you.         Sincerely,        Dayne Lopez MD        CC: MD Jessica Briceño Lee G, MD  12/03/24 0748  Sign when Signing Visit  CHIEF COMPLAINT: No significant arthralgias or rashes.  Here for follow-up on abnormal labs    Problem List:  Oncology/Hematology History Overview Note   1.  Transient and intermittent mild leukocytosis with elevated ferritin with normal iron and no family history of hemochromatosis    -3/30/2021 white count 11,600 otherwise unremarkable CBC  -6/26/2023 white count 11,100 otherwise unremarkable CBC  -6/25/2024 ferritin 292.  Iron normal 88.  White count 12,200 otherwise unremarkable CBC.  -7/17/2024 ferritin 316.  White count 11,100 otherwise unremarkable CBC  -8/9/2024 low-dose CT chest lung RADS 2 benign appearing less than 1% chance of malignancy 4 mm micronodule left lower lobe with central punctate calcification and a couple of tiny calcified granuloma left lower lobe.  -10/22/2024 ferritin 300.  White count normal 10,800 upper limit of normal.  Unremarkable CBC.    -11/12/2024 [facility] consult: Patient with very mild and ultimately transient leukocytosis with mild elevation of ferritin and prior normal iron.  Multiple inflammatory conditions including chronic sinusitis and arthritis are likely culprits.  Anything that can increase macrophages (which all contain ferritin molecules) can increase ferritin absent elevation of actual iron.  Hence ferritin is an acute phase reactant and as does the white count so  we will usually go the ferritin.  I will check her HFE gene mutation for completeness but the odds of hemochromatosis are exceedingly unlikely and the odds of anything other than a mild inflammatory leukocytosis given the chronicity of this is exceedingly unlikely.  I will see her back in a few weeks to go over results.    -11/14/2024 white count 15,630 otherwise unremarkable CBC and differential.Sedimentation rate 30 normal.  C-reactive protein less than 0.3.  Ferritin 303 with iron normal 67 and decreased saturation 17% with normal transferrin 259 and normal total iron binding capacity 386.  Heterozygous H63D hemochromatosis gene mutation    -12/3/2024 [facility] follow-up: White count consistently elevated with unremarkable differential and no increase in inflammatory markers.  Ferritin still elevated despite normal iron and decreased iron saturation.  Very unlikely to be clinically relevant hemochromatosis with heterozygous H63D mutation which is the second most common inherited mutation for which carriers are usually unaffected.  I will check her BCR ABL quantitative PCR and her flow cytometry and we will have her see my nurse practitioner back in the weeks ahead to go over that.  If those are negative then I will have my nurse practitioner order noncontrast MRI abdomen liver iron quantitation.  The liver enzymes are normal, if there is increased liver iron on the MRI then periodic blood donations at the very minimum would be reasonable but the odds of clinically penetrant heterozygous hemochromatosis with a decreased iron saturation and a normal transferrin is exceedingly unlikely.    -12/3/2024 [facility] follow-up: White count consistently elevated with unremarkable differential and no increase in inflammatory markers.  Ferritin still elevated despite normal iron and decreased iron saturation.  Very unlikely to be clinically relevant hemochromatosis with heterozygous H63D mutation which is  the second most common inherited mutation for which carriers are usually unaffected.  I will check her BCR ABL quantitative PCR and her flow cytometry and we will have her see my nurse practitioner back in the weeks ahead to go over that.  If those are negative then I will have my nurse practitioner order noncontrast MRI abdomen liver iron quantitation.  The liver enzymes are normal, if there is increased liver iron on the MRI then periodic blood donations at the very minimum would be reasonable but the odds of clinically penetrant heterozygous hemochromatosis with a decreased iron saturation and a normal transferrin is exceedingly unlikely.  If flow cytometry or BCR-ABL are abnormal, my nurse practitioner will discuss with me regarding management.     Elevated ferritin       HISTORY OF PRESENT ILLNESS:  The patient is a 64 y.o. female, here for follow up on management of follow-up of mild leukocytosis and elevated ferritin    Past Medical History:   Diagnosis Date   • Acute non-recurrent maxillary sinusitis    • Anxiety    • Benign hypertension    • BMI 30.0-30.9,adult    • CAD (coronary artery disease)    • Chest pain    • Cholelithiases    • Diabetes    • Dyslipidemia    • Hypercholesterolemia    • Irritable bowel disease    • Migraine headache    • Tobacco use      Past Surgical History:   Procedure Laterality Date   • CHOLECYSTECTOMY  1999   • HYSTERECTOMY     • TUBAL ABDOMINAL LIGATION Bilateral 2002       Allergies   Allergen Reactions   • Cefdinir Unknown - High Severity   • Levofloxacin Unknown - High Severity   • Sulfamethoxazole Unknown - High Severity   • Trimethoprim Unknown - High Severity   • Penicillins Rash       Family History and Social History reviewed and changed as necessary    REVIEW OF SYSTEM:   No arthralgias or rashes.    PHYSICAL EXAM:  No synovitis.  No icterus or jaundice.  No abdominal tenderness.  No respiratory distress.    Vitals:    12/03/24 0738   BP: 121/74   Pulse: 97   Resp: 18  "  Temp: 97 °F (36.1 °C)   SpO2: 98%   Weight: 78.9 kg (174 lb)   Height: 167.6 cm (66\")     Vitals:    12/03/24 0738   PainSc: 0-No pain          ECOG score: 0           Vitals reviewed.    ECOG: (0) Fully Active - Able to Carry On All Pre-disease Performance Without Restriction    Lab Results   Component Value Date    HGB 14.8 11/14/2024    HCT 45.8 11/14/2024    MCV 93.3 11/14/2024     11/14/2024    WBC 15.63 (H) 11/14/2024    NEUTROABS 11.01 (H) 11/14/2024    LYMPHSABS 3.34 (H) 11/14/2024    MONOSABS 0.95 (H) 11/14/2024    EOSABS 0.14 11/14/2024    BASOSABS 0.10 11/14/2024       Lab Results   Component Value Date    GLUCOSE 105 (H) 10/31/2024    BUN 21 10/31/2024    CREATININE 0.73 10/31/2024     10/31/2024    K 5.2 10/31/2024     10/31/2024    CO2 21 10/31/2024    CALCIUM 9.9 10/31/2024    ALBUMIN 4.7 10/02/2024    BILITOT 0.3 10/02/2024    ALKPHOS 109 10/02/2024    AST 29 10/02/2024    ALT 19 10/02/2024             ASSESSMENT & PLAN:  1.  Transient and intermittent mild leukocytosis with elevated ferritin with normal iron and no family history of hemochromatosis    -3/30/2021 white count 11,600 otherwise unremarkable CBC  -6/26/2023 white count 11,100 otherwise unremarkable CBC  -6/25/2024 ferritin 292.  Iron normal 88.  White count 12,200 otherwise unremarkable CBC.  -7/17/2024 ferritin 316.  White count 11,100 otherwise unremarkable CBC  -8/9/2024 low-dose CT chest lung RADS 2 benign appearing less than 1% chance of malignancy 4 mm micronodule left lower lobe with central punctate calcification and a couple of tiny calcified granuloma left lower lobe.  -10/22/2024 ferritin 300.  White count normal 10,800 upper limit of normal.  Unremarkable CBC.    -11/12/2024 [facility] consult: Patient with very mild and ultimately transient leukocytosis with mild elevation of ferritin and prior normal iron.  Multiple inflammatory conditions including chronic sinusitis and arthritis are likely " culprits.  Anything that can increase macrophages (which all contain ferritin molecules) can increase ferritin absent elevation of actual iron.  Hence ferritin is an acute phase reactant and as does the white count so we will usually go the ferritin.  I will check her HFE gene mutation for completeness but the odds of hemochromatosis are exceedingly unlikely and the odds of anything other than a mild inflammatory leukocytosis given the chronicity of this is exceedingly unlikely.  I will see her back in a few weeks to go over results.    -11/14/2024 white count 15,630 otherwise unremarkable CBC and differential.Sedimentation rate 30 normal.  C-reactive protein less than 0.3.  Ferritin 303 with iron normal 67 and decreased saturation 17% with normal transferrin 259 and normal total iron binding capacity 386.  Heterozygous H63D hemochromatosis gene mutation    -12/3/2024 [facility] follow-up: White count consistently elevated with unremarkable differential and no increase in inflammatory markers.  Ferritin still elevated despite normal iron and decreased iron saturation.  Very unlikely to be clinically relevant hemochromatosis with heterozygous H63D mutation which is the second most common inherited mutation for which carriers are usually unaffected.  I will check her BCR ABL quantitative PCR and her flow cytometry and we will have her see my nurse practitioner back in the weeks ahead to go over that.  If those are negative then I will have my nurse practitioner order noncontrast MRI abdomen liver iron quantitation.  The liver enzymes are normal, if there is increased liver iron on the MRI then periodic blood donations at the very minimum would be reasonable but the odds of clinically penetrant heterozygous hemochromatosis with a decreased iron saturation and a normal transferrin is exceedingly unlikely.  If flow cytometry or BCR-ABL are abnormal, my nurse practitioner will discuss with me regarding  management.    Total time of care today inclusive of time spent today prior to patient's arrival reviewing interval data and during visit translating to patient putting forth a plan as outlined above and after visit instituting this plan took 50 minutes patient care time throughout the day today.  Dayne Lopez MD    12/03/2024

## 2024-12-03 NOTE — LETTER
December 3, 2024     TOMASA Bethea  [address]  [place] [address]    Patient: Jory Taylor   YOB: 1960   Date of Visit: 12/3/2024     Dear TOMASA Bethea:       Thank you for referring Jory Taylor to me for evaluation. Below are the relevant portions of my assessment and plan of care.    If you have questions, please do not hesitate to call me. I look forward to following Jory along with you.         Sincerely,        Dayne Lopez MD        CC: MD Jessica Briceño Lee G, MD  12/03/24 0748  Sign when Signing Visit  CHIEF COMPLAINT: No significant arthralgias or rashes.  Here for follow-up on abnormal labs    Problem List:  Oncology/Hematology History Overview Note   1.  Transient and intermittent mild leukocytosis with elevated ferritin with normal iron and no family history of hemochromatosis    -3/30/2021 white count 11,600 otherwise unremarkable CBC  -6/26/2023 white count 11,100 otherwise unremarkable CBC  -6/25/2024 ferritin 292.  Iron normal 88.  White count 12,200 otherwise unremarkable CBC.  -7/17/2024 ferritin 316.  White count 11,100 otherwise unremarkable CBC  -8/9/2024 low-dose CT chest lung RADS 2 benign appearing less than 1% chance of malignancy 4 mm micronodule left lower lobe with central punctate calcification and a couple of tiny calcified granuloma left lower lobe.  -10/22/2024 ferritin 300.  White count normal 10,800 upper limit of normal.  Unremarkable CBC.    -11/12/2024 [facility] consult: Patient with very mild and ultimately transient leukocytosis with mild elevation of ferritin and prior normal iron.  Multiple inflammatory conditions including chronic sinusitis and arthritis are likely culprits.  Anything that can increase macrophages (which all contain ferritin molecules) can increase ferritin absent elevation of actual iron.  Hence ferritin is an acute phase reactant and as does the white count so  we will usually go the ferritin.  I will check her HFE gene mutation for completeness but the odds of hemochromatosis are exceedingly unlikely and the odds of anything other than a mild inflammatory leukocytosis given the chronicity of this is exceedingly unlikely.  I will see her back in a few weeks to go over results.    -11/14/2024 white count 15,630 otherwise unremarkable CBC and differential.Sedimentation rate 30 normal.  C-reactive protein less than 0.3.  Ferritin 303 with iron normal 67 and decreased saturation 17% with normal transferrin 259 and normal total iron binding capacity 386.  Heterozygous H63D hemochromatosis gene mutation    -12/3/2024 [facility] follow-up: White count consistently elevated with unremarkable differential and no increase in inflammatory markers.  Ferritin still elevated despite normal iron and decreased iron saturation.  Very unlikely to be clinically relevant hemochromatosis with heterozygous H63D mutation which is the second most common inherited mutation for which carriers are usually unaffected.  I will check her BCR ABL quantitative PCR and her flow cytometry and we will have her see my nurse practitioner back in the weeks ahead to go over that.  If those are negative then I will have my nurse practitioner order noncontrast MRI abdomen liver iron quantitation.  The liver enzymes are normal, if there is increased liver iron on the MRI then periodic blood donations at the very minimum would be reasonable but the odds of clinically penetrant heterozygous hemochromatosis with a decreased iron saturation and a normal transferrin is exceedingly unlikely.    -12/3/2024 [facility] follow-up: White count consistently elevated with unremarkable differential and no increase in inflammatory markers.  Ferritin still elevated despite normal iron and decreased iron saturation.  Very unlikely to be clinically relevant hemochromatosis with heterozygous H63D mutation which is  the second most common inherited mutation for which carriers are usually unaffected.  I will check her BCR ABL quantitative PCR and her flow cytometry and we will have her see my nurse practitioner back in the weeks ahead to go over that.  If those are negative then I will have my nurse practitioner order noncontrast MRI abdomen liver iron quantitation.  The liver enzymes are normal, if there is increased liver iron on the MRI then periodic blood donations at the very minimum would be reasonable but the odds of clinically penetrant heterozygous hemochromatosis with a decreased iron saturation and a normal transferrin is exceedingly unlikely.  If flow cytometry or BCR-ABL are abnormal, my nurse practitioner will discuss with me regarding management.     Elevated ferritin       HISTORY OF PRESENT ILLNESS:  The patient is a 64 y.o. female, here for follow up on management of follow-up of mild leukocytosis and elevated ferritin    Past Medical History:   Diagnosis Date   • Acute non-recurrent maxillary sinusitis    • Anxiety    • Benign hypertension    • BMI 30.0-30.9,adult    • CAD (coronary artery disease)    • Chest pain    • Cholelithiases    • Diabetes    • Dyslipidemia    • Hypercholesterolemia    • Irritable bowel disease    • Migraine headache    • Tobacco use      Past Surgical History:   Procedure Laterality Date   • CHOLECYSTECTOMY  1999   • HYSTERECTOMY     • TUBAL ABDOMINAL LIGATION Bilateral 2002       Allergies   Allergen Reactions   • Cefdinir Unknown - High Severity   • Levofloxacin Unknown - High Severity   • Sulfamethoxazole Unknown - High Severity   • Trimethoprim Unknown - High Severity   • Penicillins Rash       Family History and Social History reviewed and changed as necessary    REVIEW OF SYSTEM:   No arthralgias or rashes.    PHYSICAL EXAM:  No synovitis.  No icterus or jaundice.  No abdominal tenderness.  No respiratory distress.    Vitals:    12/03/24 0738   BP: 121/74   Pulse: 97   Resp: 18  "  Temp: 97 °F (36.1 °C)   SpO2: 98%   Weight: 78.9 kg (174 lb)   Height: 167.6 cm (66\")     Vitals:    12/03/24 0738   PainSc: 0-No pain          ECOG score: 0           Vitals reviewed.    ECOG: (0) Fully Active - Able to Carry On All Pre-disease Performance Without Restriction    Lab Results   Component Value Date    HGB 14.8 11/14/2024    HCT 45.8 11/14/2024    MCV 93.3 11/14/2024     11/14/2024    WBC 15.63 (H) 11/14/2024    NEUTROABS 11.01 (H) 11/14/2024    LYMPHSABS 3.34 (H) 11/14/2024    MONOSABS 0.95 (H) 11/14/2024    EOSABS 0.14 11/14/2024    BASOSABS 0.10 11/14/2024       Lab Results   Component Value Date    GLUCOSE 105 (H) 10/31/2024    BUN 21 10/31/2024    CREATININE 0.73 10/31/2024     10/31/2024    K 5.2 10/31/2024     10/31/2024    CO2 21 10/31/2024    CALCIUM 9.9 10/31/2024    ALBUMIN 4.7 10/02/2024    BILITOT 0.3 10/02/2024    ALKPHOS 109 10/02/2024    AST 29 10/02/2024    ALT 19 10/02/2024             ASSESSMENT & PLAN:  1.  Transient and intermittent mild leukocytosis with elevated ferritin with normal iron and no family history of hemochromatosis    -3/30/2021 white count 11,600 otherwise unremarkable CBC  -6/26/2023 white count 11,100 otherwise unremarkable CBC  -6/25/2024 ferritin 292.  Iron normal 88.  White count 12,200 otherwise unremarkable CBC.  -7/17/2024 ferritin 316.  White count 11,100 otherwise unremarkable CBC  -8/9/2024 low-dose CT chest lung RADS 2 benign appearing less than 1% chance of malignancy 4 mm micronodule left lower lobe with central punctate calcification and a couple of tiny calcified granuloma left lower lobe.  -10/22/2024 ferritin 300.  White count normal 10,800 upper limit of normal.  Unremarkable CBC.    -11/12/2024 [facility] consult: Patient with very mild and ultimately transient leukocytosis with mild elevation of ferritin and prior normal iron.  Multiple inflammatory conditions including chronic sinusitis and arthritis are likely " culprits.  Anything that can increase macrophages (which all contain ferritin molecules) can increase ferritin absent elevation of actual iron.  Hence ferritin is an acute phase reactant and as does the white count so we will usually go the ferritin.  I will check her HFE gene mutation for completeness but the odds of hemochromatosis are exceedingly unlikely and the odds of anything other than a mild inflammatory leukocytosis given the chronicity of this is exceedingly unlikely.  I will see her back in a few weeks to go over results.    -11/14/2024 white count 15,630 otherwise unremarkable CBC and differential.Sedimentation rate 30 normal.  C-reactive protein less than 0.3.  Ferritin 303 with iron normal 67 and decreased saturation 17% with normal transferrin 259 and normal total iron binding capacity 386.  Heterozygous H63D hemochromatosis gene mutation    -12/3/2024 [facility] follow-up: White count consistently elevated with unremarkable differential and no increase in inflammatory markers.  Ferritin still elevated despite normal iron and decreased iron saturation.  Very unlikely to be clinically relevant hemochromatosis with heterozygous H63D mutation which is the second most common inherited mutation for which carriers are usually unaffected.  I will check her BCR ABL quantitative PCR and her flow cytometry and we will have her see my nurse practitioner back in the weeks ahead to go over that.  If those are negative then I will have my nurse practitioner order noncontrast MRI abdomen liver iron quantitation.  The liver enzymes are normal, if there is increased liver iron on the MRI then periodic blood donations at the very minimum would be reasonable but the odds of clinically penetrant heterozygous hemochromatosis with a decreased iron saturation and a normal transferrin is exceedingly unlikely.  If flow cytometry or BCR-ABL are abnormal, my nurse practitioner will discuss with me regarding  management.    Total time of care today inclusive of time spent today prior to patient's arrival reviewing interval data and during visit translating to patient putting forth a plan as outlined above and after visit instituting this plan took 50 minutes patient care time throughout the day today.  Dayne Lopez MD    12/03/2024

## 2024-12-05 LAB — Lab: NORMAL

## 2024-12-08 LAB
INTERPRETATION: NEGATIVE
LAB DIRECTOR NAME PROVIDER: NORMAL
LABORATORY COMMENT REPORT: NORMAL
REF LAB TEST METHOD: NORMAL
T(ABL1,BCR)B2A2/CONTROL BLD/T: NORMAL %
T(ABL1,BCR)B3A2/CONTROL BLD/T: NORMAL %
T(ABL1,BCR)E1A2/CONTROL BLD/T: NORMAL %

## 2024-12-11 ENCOUNTER — TELEPHONE (OUTPATIENT)
Dept: ONCOLOGY | Facility: CLINIC | Age: 64
End: 2024-12-11
Payer: COMMERCIAL

## 2024-12-11 DIAGNOSIS — R79.89 ELEVATED FERRITIN: Primary | ICD-10-CM

## 2024-12-11 NOTE — TELEPHONE ENCOUNTER
Caller: Jory Taylor    Relationship: Self    Best call back number: [phone]    PLEASE LEAVE A DETAILED VOICEMAIL IF UNABLE TO REACH     Caller requesting test results: SELF    What test was performed: LABS    When was the test performed: 12/03/24    Where was the test performed:  [facility]    Additional notes: WOULD LIKE TO GO OVER THE RESULTS WITH CLINICAL STAFF

## 2024-12-13 ENCOUNTER — TELEPHONE (OUTPATIENT)
Dept: FAMILY MEDICINE CLINIC | Facility: CLINIC | Age: 64
End: 2024-12-13
Payer: COMMERCIAL

## 2024-12-13 NOTE — TELEPHONE ENCOUNTER
Called patient to let her know that she has to go over the labs with their office. We can not go over their labs with her. Please contact their office. Had to leave a detailed message. Will send a mychart msg too

## 2024-12-13 NOTE — TELEPHONE ENCOUNTER
Caller: Jory Taylor    Relationship: Self    Best call back number:   Telephone Information:   Mobile [phone]        What test was performed: BLOOD WORK    When was the test performed: 12/03/2024    Where was the test performed: [facility]      Additional notes: PATIENT HAD LABS DONE BY DR GELA ISLAS. PATIENT HAS REACHED OUT TO HIM TO GO OVER THEM AND HAS NOT HEARD ANYTHING BACK. PATIENT WOULD LIKE A CALL BACK TO GO OVER THESE RESULTS AS SOON AS POSSIBLE.

## 2024-12-13 NOTE — TELEPHONE ENCOUNTER
Called patient and informed her results were reviewed with TOMASA Coley and both BCR/ABL and flow were normal so we will proceed with MRI abdomen without contrast iron quantitation protocol. Patient verbalized understanding.

## 2024-12-13 NOTE — TELEPHONE ENCOUNTER
Caller: Jory Taylor    Relationship: Self    Best call back number: [phone] OR LEAVE A DETAILED VOICEMAIL    What was the call regarding: PATIENT CALLING BACK. WOULD YOU CALL HER TO DISCUSS LAB RESULTS FROM 12/3/2024.    CAN YOU LEAVE DETAILED MYCHART MESSAGE

## 2024-12-26 ENCOUNTER — HOSPITAL ENCOUNTER (OUTPATIENT)
Dept: MRI IMAGING | Facility: HOSPITAL | Age: 64
Discharge: HOME | End: 2024-12-26
Admitting: INTERNAL MEDICINE
Payer: COMMERCIAL

## 2024-12-26 DIAGNOSIS — R79.89 ELEVATED FERRITIN: ICD-10-CM

## 2024-12-26 PROCEDURE — 74181 MRI ABDOMEN W/O CONTRAST: CPT

## 2024-12-28 DIAGNOSIS — E11.9 TYPE 2 DIABETES MELLITUS WITHOUT COMPLICATION, WITHOUT LONG-TERM CURRENT USE OF INSULIN: ICD-10-CM

## 2025-01-02 ENCOUNTER — OFFICE VISIT (OUTPATIENT)
Dept: ONCOLOGY | Facility: CLINIC | Age: 65
End: 2025-01-02
Payer: COMMERCIAL

## 2025-01-02 VITALS
BODY MASS INDEX: 28.61 KG/M2 | RESPIRATION RATE: 18 BRPM | SYSTOLIC BLOOD PRESSURE: 113 MMHG | WEIGHT: 178 LBS | TEMPERATURE: 97.8 F | HEART RATE: 86 BPM | DIASTOLIC BLOOD PRESSURE: 70 MMHG | OXYGEN SATURATION: 96 % | HEIGHT: 66 IN

## 2025-01-02 DIAGNOSIS — R79.89 ELEVATED FERRITIN: Primary | ICD-10-CM

## 2025-01-02 PROCEDURE — 99214 OFFICE O/P EST MOD 30 MIN: CPT | Performed by: NURSE PRACTITIONER

## 2025-01-02 RX ORDER — SEMAGLUTIDE 0.68 MG/ML
0.5 INJECTION, SOLUTION SUBCUTANEOUS WEEKLY
Qty: 3 ML | Refills: 2 | Status: SHIPPED | OUTPATIENT
Start: 2025-01-02

## 2025-01-02 NOTE — TELEPHONE ENCOUNTER
Name: Jory Taylor    Relationship: Self    Best Callback Number: [phone]    HUB PROVIDED THE RELAY MESSAGE FROM THE OFFICE   PATIENT VOICED UNDERSTANDING AND HAS NO FURTHER QUESTIONS AT THIS TIME    ADDITIONAL INFORMATION: PATIENT IS TAKING 0.5MG OF OZEMPIC

## 2025-01-02 NOTE — PROGRESS NOTES
CHIEF COMPLAINT: Fatigue    Problem List:  Oncology/Hematology History Overview Note   1.  Transient and intermittent mild leukocytosis with elevated ferritin with normal iron and no family history of hemochromatosis  -Heterozygous H63D hemochromatosis gene mutation  -12/3/2024 labs: BCR-ABL negative, flow cytometry negative, no significant immunophenotypic abnormality detected.  -12/26/2024 MRI abdomen without contrast shows no evidence of abnormal hepatic iron deposition, unremarkable noncontrast appearance of the liver.      -3/30/2021 white count 11,600 otherwise unremarkable CBC  -6/26/2023 white count 11,100 otherwise unremarkable CBC  -6/25/2024 ferritin 292.  Iron normal 88.  White count 12,200 otherwise unremarkable CBC.  -7/17/2024 ferritin 316.  White count 11,100 otherwise unremarkable CBC  -8/9/2024 low-dose CT chest lung RADS 2 benign appearing less than 1% chance of malignancy 4 mm micronodule left lower lobe with central punctate calcification and a couple of tiny calcified granuloma left lower lobe.  -10/22/2024 ferritin 300.  White count normal 10,800 upper limit of normal.  Unremarkable CBC.    -11/12/2024 [facility] hematology consult: Patient with very mild and ultimately transient leukocytosis with mild elevation of ferritin and prior normal iron.  Multiple inflammatory conditions including chronic sinusitis and arthritis are likely culprits.  Anything that can increase macrophages (which all contain ferritin molecules) can increase ferritin absent elevation of actual iron.  Hence ferritin is an acute phase reactant and as does the white count so we will usually go the ferritin.  I will check her HFE gene mutation for completeness but the odds of hemochromatosis are exceedingly unlikely and the odds of anything other than a mild inflammatory leukocytosis given the chronicity of this is exceedingly unlikely.  I will see her back in a few weeks to go over results.    -11/14/2024 white count  15,630 otherwise unremarkable CBC and differential.Sedimentation rate 30 normal.  C-reactive protein less than 0.3.  Ferritin 303 with iron normal 67 and decreased saturation 17% with normal transferrin 259 and normal total iron binding capacity 386.  Heterozygous H63D hemochromatosis gene mutation    -12/3/2024 [facility]ology follow-up: White count consistently elevated with unremarkable differential and no increase in inflammatory markers.  Ferritin still elevated despite normal iron and decreased iron saturation.  Very unlikely to be clinically relevant hemochromatosis with heterozygous H63D mutation which is the second most common inherited mutation for which carriers are usually unaffected.  I will check her BCR ABL quantitative PCR and her flow cytometry and we will have her see my nurse practitioner back in the weeks ahead to go over that.  If those are negative then I will have my nurse practitioner order noncontrast MRI abdomen liver iron quantitation.  The liver enzymes are normal, if there is increased liver iron on the MRI then periodic blood donations at the very minimum would be reasonable but the odds of clinically penetrant heterozygous hemochromatosis with a decreased iron saturation and a normal transferrin is exceedingly unlikely.    -12/3/2024 [facility] follow-up: White count consistently elevated with unremarkable differential and no increase in inflammatory markers.  Ferritin still elevated despite normal iron and decreased iron saturation.  Very unlikely to be clinically relevant hemochromatosis with heterozygous H63D mutation which is the second most common inherited mutation for which carriers are usually unaffected.  I will check her BCR ABL quantitative PCR and her flow cytometry and we will have her see my nurse practitioner back in the weeks ahead to go over that.  If those are negative then I will have my nurse practitioner order noncontrast MRI abdomen liver iron  quantitation.  The liver enzymes are normal, if there is increased liver iron on the MRI then periodic blood donations at the very minimum would be reasonable but the odds of clinically penetrant heterozygous hemochromatosis with a decreased iron saturation and a normal transferrin is exceedingly unlikely.  If flow cytometry or BCR-ABL are abnormal, my nurse practitioner will discuss with me regarding management.    -12/3/2024 labs: BCR-ABL negative, flow cytometry negative, no significant immunophenotypic abnormality detected.  -12/26/2024 MRI abdomen without contrast shows no evidence of abnormal hepatic iron deposition, unremarkable noncontrast appearance of the liver.     Elevated ferritin       HISTORY OF PRESENT ILLNESS:  The patient is a 64 y.o. female, here for follow up on management of elevated ferritin with heterozygous H63D gene mutation.  Jory reports she continues to deal with chronic fatigue.  She has had no change in her health since we saw her last.  She smokes typically less than 5 cigarettes daily.  She is very busy at work and is on her feet most of the day.  No unusual shortness of breath or cough.    Past Medical History:   Diagnosis Date    Acute non-recurrent maxillary sinusitis     Anxiety     Benign hypertension     BMI 30.0-30.9,adult     CAD (coronary artery disease)     Chest pain     Cholelithiases     Diabetes     Dyslipidemia     Hypercholesterolemia     Irritable bowel disease     Migraine headache     Tobacco use      Past Surgical History:   Procedure Laterality Date    CHOLECYSTECTOMY  1999    HYSTERECTOMY      TUBAL ABDOMINAL LIGATION Bilateral 2002       Allergies   Allergen Reactions    Cefdinir Unknown - High Severity    Levofloxacin Unknown - High Severity    Sulfamethoxazole Unknown - High Severity    Trimethoprim Unknown - High Severity    Penicillins Rash       Family History and Social History reviewed and changed as necessary    REVIEW OF SYSTEM:   Chronic  "fatigue    PHYSICAL EXAM:  Well-developed, well-nourished appearing female in no distress  Respirations regular and unlabored    Vitals:    01/02/25 1009   BP: 113/70   Pulse: 86   Resp: 18   Temp: 97.8 °F (36.6 °C)   SpO2: 96%   Weight: 80.7 kg (178 lb)   Height: 167.6 cm (66\")     Vitals:    01/02/25 1009   PainSc: 0-No pain          ECOG score: 0           Vitals reviewed.  Labs reviewed along with results from MRI abdomen from 12/26/2024 that was negative.    ECOG: (0) Fully Active - Able to Carry On All Pre-disease Performance Without Restriction    Lab Results   Component Value Date    HGB 13.9 12/03/2024    HCT 42.0 12/03/2024    MCV 92.7 12/03/2024     12/03/2024    WBC 12.42 (H) 12/03/2024    NEUTROABS 7.79 (H) 12/03/2024    LYMPHSABS 3.39 (H) 12/03/2024    MONOSABS 0.93 (H) 12/03/2024    EOSABS 0.20 12/03/2024    BASOSABS 0.05 12/03/2024       Lab Results   Component Value Date    GLUCOSE 105 (H) 10/31/2024    BUN 21 10/31/2024    CREATININE 0.73 10/31/2024     10/31/2024    K 5.2 10/31/2024     10/31/2024    CO2 21 10/31/2024    CALCIUM 9.9 10/31/2024    ALBUMIN 4.7 10/02/2024    BILITOT 0.3 10/02/2024    ALKPHOS 109 10/02/2024    AST 29 10/02/2024    ALT 19 10/02/2024     MRI Abdomen Without Contrast    Result Date: 12/30/2024  Impression: Unremarkable noncontrast appearance of the liver, without evidence of abnormal hepatic iron deposition. Electronically Signed: Eliazar Muñiz MD  12/30/2024 10:10 AM EST  Workstation ID: [id number]           ASSESSMENT & PLAN:    1.  Transient and intermittent mild leukocytosis with elevated ferritin with normal iron and no family history of hemochromatosis  -Heterozygous H63D hemochromatosis gene mutation  -12/3/2024 labs: BCR-ABL negative, flow cytometry negative, no significant immunophenotypic abnormality detected.  -12/26/2024 MRI abdomen without contrast shows no evidence of abnormal hepatic iron deposition, unremarkable noncontrast appearance of " the liver.  2.  Chronic fatigue    Discussion: Workup for leukocytosis and elevated ferritin thus far negative other than for heterozygous H63D gene mutation.  As stated previously this is unlikely to be clinically relevant hemochromatosis.  Her BCR-ABL was negative.  MRI of the abdomen showed no hepatic iron deposition.  We will repeat her labs again in 6 months and she can follow-up then with Dr. Lopez to see if she needs ongoing management or surveillance of her ferritin in the absence of new symptoms.  We did discuss smoking cessation today.  I see no obvious cause for her chronic fatigue, she has an appointment with her PCP tomorrow for further evaluation.  She has been under a lot of stress the past few years caring for her mother who had been ill and recently passed away.  We discussed that some of her fatigue could be due to chronic stress and I encouraged her to try and find activities outside of work that she enjoys.    I spent 31 minutes caring for Jory on this date of service. This time includes time spent by me in the following activities: preparing for the visit, reviewing tests, performing a medically appropriate examination and/or evaluation, counseling and educating the patient/family/caregiver, ordering medications, tests, or procedures, documenting information in the medical record, and independently interpreting results and communicating that information with the patient/family/caregiver.     Vianca Long, APRN    01/02/2025

## 2025-01-02 NOTE — TELEPHONE ENCOUNTER
Attempted. Unable to leave voice mail    Pt has appt with Popeye on 1/3/25. Can also discuss at appt if patient does not call back.       HUB TO RELAY    Please clarify with patient, is she taking the 0.5mg ozempic?

## 2025-01-03 ENCOUNTER — OFFICE VISIT (OUTPATIENT)
Dept: FAMILY MEDICINE CLINIC | Facility: CLINIC | Age: 65
End: 2025-01-03
Payer: COMMERCIAL

## 2025-01-03 VITALS
WEIGHT: 177.44 LBS | SYSTOLIC BLOOD PRESSURE: 126 MMHG | HEART RATE: 97 BPM | OXYGEN SATURATION: 98 % | BODY MASS INDEX: 28.52 KG/M2 | DIASTOLIC BLOOD PRESSURE: 84 MMHG | HEIGHT: 66 IN

## 2025-01-03 DIAGNOSIS — D72.829 LEUKOCYTOSIS, UNSPECIFIED TYPE: ICD-10-CM

## 2025-01-03 DIAGNOSIS — E11.9 TYPE 2 DIABETES MELLITUS WITHOUT COMPLICATION, WITHOUT LONG-TERM CURRENT USE OF INSULIN: ICD-10-CM

## 2025-01-03 DIAGNOSIS — E78.5 HYPERLIPIDEMIA LDL GOAL <100: ICD-10-CM

## 2025-01-03 DIAGNOSIS — E56.9 VITAMIN DEFICIENCY: ICD-10-CM

## 2025-01-03 DIAGNOSIS — I10 ESSENTIAL HYPERTENSION: Primary | ICD-10-CM

## 2025-01-03 DIAGNOSIS — E55.9 VITAMIN D DEFICIENCY: ICD-10-CM

## 2025-01-03 DIAGNOSIS — Z12.4 SCREENING FOR CERVICAL CANCER: ICD-10-CM

## 2025-01-03 DIAGNOSIS — R79.89 ELEVATED FERRITIN: ICD-10-CM

## 2025-01-03 DIAGNOSIS — Z12.11 SCREENING FOR COLON CANCER: ICD-10-CM

## 2025-01-03 DIAGNOSIS — Z79.899 ENCOUNTER FOR LONG-TERM (CURRENT) USE OF MEDICATIONS: ICD-10-CM

## 2025-01-03 PROCEDURE — 99214 OFFICE O/P EST MOD 30 MIN: CPT | Performed by: NURSE PRACTITIONER

## 2025-01-03 NOTE — PROGRESS NOTES
"Chief Complaint  labs    Subjective          Jory Taylor presents to [facility]  History of Present Illness  History of Present Illness  The patient is a 64-year-old female here for a follow-up of diabetes.    She is currently on Ozempic 0.5 mg for her diabetes management and doing well. She has been fasting today in preparation for her lab work.    She reports experiencing fatigue, which she attributes to the physical and emotional toll of caring for her mother over the past 6 years. Despite this, she does not believe she is suffering from depression. She has not been receiving B12 injections or taking any supplements. She had previously been on a vitamin D3 supplement but discontinued its use.    She has expressed interest in undergoing a colonoscopy within the next few weeks. She also intends to have a Pap smear performed. Her last ophthalmological examination was conducted either in 2022 or 2023 and states she will schedule.    Supplemental Information  She saw Elissa Long yesterday. She will go back in 6 months to recheck.     FAMILY HISTORY  Her father had cataracts and cancer.    Patient Care Team:  Popeye Carmichael APRN as PCP - General (Nurse Practitioner)  Satnam Guillen MD as Consulting Physician (Cardiology)  Vianca Long APRN as Nurse Practitioner (Hematology and Oncology)     Objective   Vital Signs:   /84   Pulse 97   Ht 167.6 cm (66\")   Wt 80.5 kg (177 lb 7 oz)   SpO2 98%   BMI 28.64 kg/m²     Body mass index is 28.64 kg/m².    Review of Systems   Constitutional:  Positive for fatigue. Negative for chills and fever.   HENT:  Negative for congestion.    Eyes:  Negative for visual disturbance.   Respiratory:  Negative for cough, shortness of breath and wheezing.    Cardiovascular: Negative.    Gastrointestinal:  Negative for abdominal pain, constipation, diarrhea, nausea and vomiting.   Endocrine: Negative for polydipsia, polyphagia " and polyuria.   Genitourinary:  Negative for decreased urine volume, dysuria, frequency, hematuria and urgency.   Musculoskeletal:  Negative for back pain.   Skin:  Negative for rash, skin lesions and wound.   Neurological:  Negative for numbness and headache.   Psychiatric/Behavioral: Negative.         Past History:  Medical History: has a past medical history of Acute non-recurrent maxillary sinusitis, Anxiety, Benign hypertension, BMI 30.0-30.9,adult, CAD (coronary artery disease), Chest pain, Cholelithiases, Diabetes, Dyslipidemia, Hypercholesterolemia, Irritable bowel disease, Migraine headache, and Tobacco use.   Surgical History: has a past surgical history that includes Cholecystectomy (1999); Tubal ligation (Bilateral, 2002); and Hysterectomy.   Family History: family history includes Breast cancer in her maternal aunt and maternal aunt; Breast cancer (age of onset: 81) in her mother; Coronary artery disease in her father; Diabetes in her father; Hypertension in her father; Migraines in her mother; Mitral valve prolapse in her mother; Stroke in her father.   Social History: reports that she has been smoking cigarettes. She started smoking about 44 years ago. She has a 22 pack-year smoking history. She has never used smokeless tobacco. She reports that she does not currently use alcohol. She reports that she does not use drugs.    PHQ-2 Depression Screening  Little interest or pleasure in doing things?     Feeling down, depressed, or hopeless?     PHQ-2 Total Score         PHQ-9 Depression Screening  Little interest or pleasure in doing things?     Feeling down, depressed, or hopeless?     PHQ-2 Total Score     Trouble falling or staying asleep, or sleeping too much?     Feeling tired or having little energy?     Poor appetite or overeating?     Feeling bad about yourself - or that you are a failure or have let yourself or your family down?     Trouble concentrating on things, such as reading the newspaper  or watching television?     Moving or speaking so slowly that other people could have noticed? Or the opposite - being so fidgety or restless that you have been moving around a lot more than usual?     Thoughts that you would be better off dead, or of hurting yourself in some way?     PHQ-9 Total Score     If you checked off any problems, how difficult have these problems made it for you to do your work, take care of things at home, or get along with other people?          PHQ-9 Total Score:        Patient screened positive for depression based on a PHQ-9 score of  on . Follow-up recommendations include:       Current Outpatient Medications:     amLODIPine-valsartan (EXFORGE) 5-320 MG per tablet, Take 1 tablet by mouth Daily., Disp: 90 tablet, Rfl: 3    ezetimibe (ZETIA) 10 MG tablet, 1 tablet Daily., Disp: , Rfl:     nebivolol (BYSTOLIC) 10 MG tablet, Take 1 tablet by mouth Daily., Disp: 90 tablet, Rfl: 3    rosuvastatin (Crestor) 10 MG tablet, Take 1 tablet by mouth Daily., Disp: 90 tablet, Rfl: 1    Semaglutide,0.25 or 0.5MG/DOS, (Ozempic, 0.25 or 0.5 MG/DOSE,) 2 MG/3ML solution pen-injector, Inject 0.5 mg under the skin into the appropriate area as directed 1 (One) Time Per Week., Disp: 3 mL, Rfl: 2   (Not in a hospital admission)     Allergies: Cefdinir, Levofloxacin, Sulfamethoxazole, Trimethoprim, and Penicillins    Physical Exam  Constitutional:       Appearance: Normal appearance.   Eyes:      Conjunctiva/sclera: Conjunctivae normal.      Pupils: Pupils are equal, round, and reactive to light.   Cardiovascular:      Rate and Rhythm: Normal rate and regular rhythm.      Heart sounds: Normal heart sounds.   Pulmonary:      Effort: Pulmonary effort is normal.      Breath sounds: Normal breath sounds.   Neurological:      General: No focal deficit present.      Mental Status: She is alert and oriented to person, place, and time. Mental status is at baseline.   Psychiatric:         Attention and Perception:  Attention and perception normal.         Mood and Affect: Mood and affect normal.         Speech: Speech normal.         Behavior: Behavior normal. Behavior is cooperative.         Thought Content: Thought content normal. Thought content does not include homicidal or suicidal ideation. Thought content does not include homicidal or suicidal plan.         Cognition and Memory: Cognition and memory normal.         Judgment: Judgment normal.        Physical Exam  Lungs are clear.    Result Review :          Results               Assessment and Plan    Diagnoses and all orders for this visit:    1. Essential hypertension (Primary)  Assessment & Plan:   continue to follow-up with cardiology.      2. Hyperlipidemia LDL goal <100  Assessment & Plan:    continue to follow-up with cardiology.    Orders:  -     Lipid Panel    3. Vitamin D deficiency  -     Vitamin D,25-Hydroxy    4. Vitamin deficiency  -     Vitamin B12    5. Screening for cervical cancer    6. Screening for colon cancer    7. Encounter for long-term (current) use of medications  -     Comprehensive Metabolic Panel    8. Leukocytosis, unspecified type  Assessment & Plan:  Continue to follow-up with hematology.      9. Elevated ferritin    10. Type 2 diabetes mellitus without complication, without long-term current use of insulin  -     Hemoglobin A1c      Assessment & Plan  1. Diabetes Mellitus.  She is currently on Ozempic 0.5 mg. If her A1c levels remain elevated, the dosage of Ozempic may be increased to 1 mg. A comprehensive metabolic panel, including A1c and cholesterol levels, will be conducted today. She will continue her current medication regimen.  Proper diet and exercise plan discussed and encouraged.  Check feet daily.  Annual dental and eye exams encouraged.  Patient states no personal or family history of thyroid cancer.  Risk of meds discussed and understood.  Return to clinic or ED with any issues or concerns.    2. Fatigue.  Her vitamin levels  were previously at the lower end of the normal range. She reports significant fatigue, which may be related to her vitamin levels or recent life stressors.  States anxiety and depression is not an issue for her.  A re-evaluation of her vitamin levels, including vitamin B12, will be performed today. If her vitamin B12 levels remain low, B12 injections may be considered to improve her energy levels.  Proper sleep hygiene discussed and encouraged.  She will keep me updated on this.    3. Health Maintenance.  She is advised to schedule a colonoscopy and states she will do this herself.  She knows she is due. She is also reminded to schedule a Pap smear.  She also states she will schedule her own Pap smear with gynecology. if any referrals are needed, she will contact the office.                    Follow Up   Return in about 6 months (around 7/3/2025), or if symptoms worsen or fail to improve.  Patient was given instructions and counseling regarding her condition or for health maintenance advice. Please see specific information pulled into the AVS if appropriate.     Patient or patient representative verbalized consent for the use of Ambient Listening during the visit with  TOMASA Bethea for chart documentation. 1/3/2025  08:53 EST    TOMASA Bethea

## 2025-01-04 LAB
25(OH)D3+25(OH)D2 SERPL-MCNC: 23.2 NG/ML (ref 30–100)
ALBUMIN SERPL-MCNC: 4.6 G/DL (ref 3.9–4.9)
ALP SERPL-CCNC: 105 IU/L (ref 44–121)
ALT SERPL-CCNC: 24 IU/L (ref 0–32)
AST SERPL-CCNC: 27 IU/L (ref 0–40)
BILIRUB SERPL-MCNC: 0.4 MG/DL (ref 0–1.2)
BUN SERPL-MCNC: 17 MG/DL (ref 8–27)
BUN/CREAT SERPL: 22 (ref 12–28)
CALCIUM SERPL-MCNC: 10.1 MG/DL (ref 8.7–10.3)
CHLORIDE SERPL-SCNC: 103 MMOL/L (ref 96–106)
CHOLEST SERPL-MCNC: 146 MG/DL (ref 100–199)
CO2 SERPL-SCNC: 23 MMOL/L (ref 20–29)
CREAT SERPL-MCNC: 0.77 MG/DL (ref 0.57–1)
EGFRCR SERPLBLD CKD-EPI 2021: 87 ML/MIN/1.73
GLOBULIN SER CALC-MCNC: 2.5 G/DL (ref 1.5–4.5)
GLUCOSE SERPL-MCNC: 90 MG/DL (ref 70–99)
HBA1C MFR BLD: 6 % (ref 4.8–5.6)
HDLC SERPL-MCNC: 57 MG/DL
LDLC SERPL CALC-MCNC: 60 MG/DL (ref 0–99)
POTASSIUM SERPL-SCNC: 5.5 MMOL/L (ref 3.5–5.2)
PROT SERPL-MCNC: 7.1 G/DL (ref 6–8.5)
SODIUM SERPL-SCNC: 140 MMOL/L (ref 134–144)
TRIGL SERPL-MCNC: 174 MG/DL (ref 0–149)
VIT B12 SERPL-MCNC: 354 PG/ML (ref 232–1245)
VLDLC SERPL CALC-MCNC: 29 MG/DL (ref 5–40)

## 2025-01-14 ENCOUNTER — TELEPHONE (OUTPATIENT)
Dept: FAMILY MEDICINE CLINIC | Facility: CLINIC | Age: 65
End: 2025-01-14
Payer: COMMERCIAL

## 2025-01-14 DIAGNOSIS — E87.5 HYPERKALEMIA: Primary | ICD-10-CM

## 2025-01-14 NOTE — TELEPHONE ENCOUNTER
Name: Jory Taylor    Relationship: Self    Best Callback Number:  [phone] (Mobile)     HUB PROVIDED THE RELAY MESSAGE FROM THE OFFICE   PATIENT VOICED UNDERSTANDING AND HAS NO FURTHER QUESTIONS AT THIS TIME

## 2025-01-14 NOTE — TELEPHONE ENCOUNTER
LM on  to call back.    HUB to relay.     ----- Message from Popeye Carmichael sent at 1/14/2025 12:23 PM EST -----  Please let patient know from labs diabetes has improved with A1c down to 6.0 which is good.  Continue current medications and continue to encourage healthy diabetic diet and daily exercise.    Vitamin D low.  Recommend she take 2000 units daily of an over-the-counter vitamin D supplement.    Her potassium level is elevated.  Make sure she is staying hydrated and drinking plenty water.  I would recommend she come back by the office this week to have her potassium level rechecked.    Triglycerides mildly elevated.  Continue current meds and again encourage healthy low-fat diet and exercise.

## 2025-01-22 ENCOUNTER — OFFICE VISIT (OUTPATIENT)
Dept: FAMILY MEDICINE CLINIC | Facility: CLINIC | Age: 65
End: 2025-01-22
Payer: COMMERCIAL

## 2025-01-22 VITALS
TEMPERATURE: 97.6 F | BODY MASS INDEX: 27.79 KG/M2 | WEIGHT: 172.9 LBS | OXYGEN SATURATION: 95 % | HEART RATE: 106 BPM | SYSTOLIC BLOOD PRESSURE: 110 MMHG | DIASTOLIC BLOOD PRESSURE: 64 MMHG | HEIGHT: 66 IN

## 2025-01-22 DIAGNOSIS — J06.9 ACUTE URI: Primary | ICD-10-CM

## 2025-01-22 DIAGNOSIS — R05.9 COUGH, UNSPECIFIED TYPE: ICD-10-CM

## 2025-01-22 LAB
EXPIRATION DATE: NORMAL
FLUAV AG UPPER RESP QL IA.RAPID: NOT DETECTED
FLUBV AG UPPER RESP QL IA.RAPID: NOT DETECTED
INTERNAL CONTROL: NORMAL
Lab: NORMAL
SARS-COV-2 AG UPPER RESP QL IA.RAPID: NOT DETECTED

## 2025-01-22 PROCEDURE — 87428 SARSCOV & INF VIR A&B AG IA: CPT | Performed by: NURSE PRACTITIONER

## 2025-01-22 PROCEDURE — 99213 OFFICE O/P EST LOW 20 MIN: CPT | Performed by: NURSE PRACTITIONER

## 2025-01-22 NOTE — PROGRESS NOTES
"Chief Complaint  URI (Pt complains of cough, congestion, drainage, aches and chills onset 3 days. )    Subjective          Jory Taylor presents to [facility]  History of Present Illness  Pt has had cough, congestion, body aches, chills, and headaches x 2 days. She was exposed to flu at work. She had mild diarrhea.   URI   Associated symptoms include congestion, coughing, diarrhea and headaches. Pertinent negatives include no chest pain or sore throat.       History of Present Illness      Objective   Vital Signs:   /64   Pulse 106   Temp 97.6 °F (36.4 °C) (Oral)   Ht 167.6 cm (66\")   Wt 78.4 kg (172 lb 14.4 oz)   SpO2 95%   BMI 27.91 kg/m²     Body mass index is 27.91 kg/m².    Review of Systems   Constitutional:  Positive for chills and fever. Negative for fatigue.   HENT:  Positive for congestion. Negative for sore throat.    Respiratory:  Positive for cough. Negative for shortness of breath.    Cardiovascular:  Negative for chest pain, palpitations and leg swelling.   Gastrointestinal:  Positive for diarrhea.   Neurological:  Negative for syncope.   Psychiatric/Behavioral:  The patient is not nervous/anxious.           Current Outpatient Medications:     amLODIPine-valsartan (EXFORGE) 5-320 MG per tablet, Take 1 tablet by mouth Daily., Disp: 90 tablet, Rfl: 3    ezetimibe (ZETIA) 10 MG tablet, 1 tablet Daily., Disp: , Rfl:     nebivolol (BYSTOLIC) 10 MG tablet, Take 1 tablet by mouth Daily., Disp: 90 tablet, Rfl: 3    rosuvastatin (Crestor) 10 MG tablet, Take 1 tablet by mouth Daily., Disp: 90 tablet, Rfl: 1    Semaglutide,0.25 or 0.5MG/DOS, (Ozempic, 0.25 or 0.5 MG/DOSE,) 2 MG/3ML solution pen-injector, Inject 0.5 mg under the skin into the appropriate area as directed 1 (One) Time Per Week., Disp: 3 mL, Rfl: 2      Allergies: Cefdinir, Levofloxacin, Sulfamethoxazole, Trimethoprim, and Penicillins    Physical Exam  Constitutional:       Appearance: Normal " appearance.   HENT:      Head: Normocephalic.      Right Ear: Tympanic membrane normal.      Left Ear: Tympanic membrane normal.      Mouth/Throat:      Pharynx: No oropharyngeal exudate or posterior oropharyngeal erythema.   Eyes:      Conjunctiva/sclera: Conjunctivae normal.      Pupils: Pupils are equal, round, and reactive to light.   Cardiovascular:      Rate and Rhythm: Normal rate and regular rhythm.      Heart sounds: Normal heart sounds.   Pulmonary:      Effort: Pulmonary effort is normal.      Breath sounds: Normal breath sounds.   Abdominal:      Tenderness: There is no abdominal tenderness.   Musculoskeletal:         General: Normal range of motion.   Skin:     General: Skin is warm and dry.      Capillary Refill: Capillary refill takes less than 2 seconds.   Neurological:      General: No focal deficit present.      Mental Status: She is alert and oriented to person, place, and time.   Psychiatric:         Mood and Affect: Mood normal.         Behavior: Behavior normal.         Thought Content: Thought content normal.         Judgment: Judgment normal.          Physical Exam         Result Review :                Results            Assessment and Plan    Diagnoses and all orders for this visit:    1. Acute URI (Primary)  Comments:  Increase fluids. Mucinex, Zyrtec, cough meds PRN. RTC for worsened sx. Call if not improving by Monday and I will send an antibiotic.    2. Cough, unspecified type  -     POCT SARS-CoV-2 + Flu Antigen NABIL                  Follow Up   Return in about 1 week (around 1/29/2025) for if not improving or sooner if symptoms worsen.  Patient was given instructions and counseling regarding her condition or for health maintenance advice. Please see specific information pulled into the AVS if appropriate.     TOMASA Mendoza

## 2025-01-22 NOTE — LETTER
January 22, 2025    Jory Taylor  [address] [id number]              Please excuse from work from 1/21/25-1/26/25. She may return to work on 1/27/25.                  Dorothy Gabriel, APRN

## 2025-01-24 ENCOUNTER — TELEPHONE (OUTPATIENT)
Dept: FAMILY MEDICINE CLINIC | Facility: CLINIC | Age: 65
End: 2025-01-24
Payer: COMMERCIAL

## 2025-01-24 RX ORDER — DOXYCYCLINE 100 MG/1
100 CAPSULE ORAL 2 TIMES DAILY
Qty: 20 CAPSULE | Refills: 0 | Status: SHIPPED | OUTPATIENT
Start: 2025-01-24

## 2025-01-24 NOTE — TELEPHONE ENCOUNTER
Caller: Jory Taylor    Relationship: Self    Best call back number: [phone]     What medication are you requesting: AN ANTIBIOTIC    What are your current symptoms: SINUS INFECTION    How long have you been experiencing symptoms: SINCE LAST VISIT WITH GEORGINA GUPTA    Have you had these symptoms before:    [] Yes  [x] No    Have you been treated for these symptoms before:   [] Yes  [x] No    If a prescription is needed, what is your preferred pharmacy and phone number: [facility]/PHARMACY #[id number] [place] [id number] [address] - [phone] [facility] [phone]      Additional notes: PATIENT ADVISED PROVIDER CANDY TOLD HER TO CALL BACK TODAY, IF NOT BETTER, AND SHE'D SEND IN AN ANTIBIOTIC FOR HER.

## 2025-01-24 NOTE — TELEPHONE ENCOUNTER
Caller: Jory Taylor    Relationship: Self    Best call back number: [phone]    What medication are you requesting: ANTIBIOTICS FOR SINUS     What are your current symptoms: DRAINAGE COUGHING     How long have you been experiencing symptoms: DAYS     Have you had these symptoms before:    [x] Yes  [] No    Have you been treated for these symptoms before:   [x] Yes  [] No    If a prescription is needed, what is your preferred pharmacy and phone number:    [facility]   Additional notes:  PATIENT STATES SHE WAS 1- HER AND PROVIDER DISCUSSED IF SHE WASN'T BETTER TO CALL IN AND REQUEST A ANTIBIOTIC. PATIENT IS NOT ANY BETTER SHE STATES. IF ABLE TO SEND IN TO PHARMACY PLEASE CONTACT PATIENT.

## 2025-01-27 ENCOUNTER — OFFICE VISIT (OUTPATIENT)
Dept: FAMILY MEDICINE CLINIC | Facility: CLINIC | Age: 65
End: 2025-01-27
Payer: COMMERCIAL

## 2025-01-27 VITALS
WEIGHT: 171.31 LBS | BODY MASS INDEX: 27.53 KG/M2 | HEART RATE: 90 BPM | DIASTOLIC BLOOD PRESSURE: 84 MMHG | OXYGEN SATURATION: 97 % | HEIGHT: 66 IN | TEMPERATURE: 98 F | SYSTOLIC BLOOD PRESSURE: 122 MMHG

## 2025-01-27 DIAGNOSIS — J01.00 ACUTE NON-RECURRENT MAXILLARY SINUSITIS: Primary | ICD-10-CM

## 2025-01-27 PROCEDURE — 96372 THER/PROPH/DIAG INJ SC/IM: CPT | Performed by: NURSE PRACTITIONER

## 2025-01-27 PROCEDURE — 99213 OFFICE O/P EST LOW 20 MIN: CPT | Performed by: NURSE PRACTITIONER

## 2025-01-27 RX ORDER — TRIAMCINOLONE ACETONIDE 40 MG/ML
60 INJECTION, SUSPENSION INTRA-ARTICULAR; INTRAMUSCULAR ONCE
Status: COMPLETED | OUTPATIENT
Start: 2025-01-27 | End: 2025-01-27

## 2025-01-27 RX ADMIN — TRIAMCINOLONE ACETONIDE 60 MG: 40 INJECTION, SUSPENSION INTRA-ARTICULAR; INTRAMUSCULAR at 15:29

## 2025-01-27 NOTE — PROGRESS NOTES
"Chief Complaint  Vomiting and Diarrhea    Subjective          Jory Taylor presents to [facility]  Vomiting   Pertinent negatives include no abdominal pain, chest pain, chills, coughing, diarrhea or fever.   Diarrhea   Pertinent negatives include no abdominal pain, chills, coughing, fever or vomiting.     History of Present Illness  The patient is a 64-year-old female who presents for evaluation of sinus infection.    She was previously evaluated by Dorothy last Tuesday, during which she was prescribed doxycycline, which she started on Friday. Despite this treatment, she reports no significant improvement in her condition. She experiences episodes of sweating, leading to wet hair, but does not attribute these symptoms to hot flashes, as she has no history of such episodes. She does not have a cough but reports expectoration and frequent nose blowing, which has since subsided. She describes a swishing sensation in her right ear, although no fluid was detected during the examination at her last appointment. She was tested for COVID-19 and influenza last week, both of which returned negative results. She also reports exposure to a person with influenza prior to all this. She has not taken any Tylenol or ibuprofen today. Her throat currently feels normal. She reports clear nasal discharge and colored sputum. She expresses a desire for a steroid injection to alleviate her symptoms.     MEDICATIONS  Current: Doxycycline, Mucinex (discontinued).    Objective   Vital Signs:   /84   Pulse 90   Temp 98 °F (36.7 °C)   Ht 167.6 cm (66\")   Wt 77.7 kg (171 lb 5 oz)   SpO2 97%   BMI 27.65 kg/m²     Body mass index is 27.65 kg/m².    Review of Systems   Constitutional:  Positive for fatigue. Negative for chills and fever.   HENT:  Positive for congestion, rhinorrhea and sinus pressure. Negative for ear discharge, ear pain, sneezing, sore throat, swollen glands and trouble " swallowing.    Respiratory:  Negative for cough, shortness of breath and wheezing.    Cardiovascular:  Negative for chest pain.   Gastrointestinal:  Negative for abdominal pain, constipation, diarrhea, nausea and vomiting.   Genitourinary:  Negative for decreased urine volume, dysuria, frequency, hematuria and urgency.   Musculoskeletal:  Negative for back pain.   Skin:  Negative for rash.   Neurological:  Negative for headache.       Past History:  Medical History: has a past medical history of Acute non-recurrent maxillary sinusitis, Anxiety, Benign hypertension, BMI 30.0-30.9,adult, CAD (coronary artery disease), Chest pain, Cholelithiases, Diabetes, Dyslipidemia, Hypercholesterolemia, Irritable bowel disease, Migraine headache, and Tobacco use.   Surgical History: has a past surgical history that includes Cholecystectomy (1999); Tubal ligation (Bilateral, 2002); and Hysterectomy.   Family History: family history includes Breast cancer in her maternal aunt and maternal aunt; Breast cancer (age of onset: 81) in her mother; Coronary artery disease in her father; Diabetes in her father; Hypertension in her father; Migraines in her mother; Mitral valve prolapse in her mother; Stroke in her father.   Social History: reports that she has been smoking cigarettes. She started smoking about 44 years ago. She has a 22 pack-year smoking history. She has never used smokeless tobacco. She reports that she does not currently use alcohol. She reports that she does not use drugs.    PHQ-2 Depression Screening  Little interest or pleasure in doing things?     Feeling down, depressed, or hopeless?     PHQ-2 Total Score         PHQ-9 Depression Screening  Little interest or pleasure in doing things?     Feeling down, depressed, or hopeless?     PHQ-2 Total Score     Trouble falling or staying asleep, or sleeping too much?     Feeling tired or having little energy?     Poor appetite or overeating?     Feeling bad about yourself - or  that you are a failure or have let yourself or your family down?     Trouble concentrating on things, such as reading the newspaper or watching television?     Moving or speaking so slowly that other people could have noticed? Or the opposite - being so fidgety or restless that you have been moving around a lot more than usual?     Thoughts that you would be better off dead, or of hurting yourself in some way?     PHQ-9 Total Score     If you checked off any problems, how difficult have these problems made it for you to do your work, take care of things at home, or get along with other people?          PHQ-9 Total Score:        Patient screened positive for depression based on a PHQ-9 score of  on . Follow-up recommendations include:          Current Outpatient Medications:     amLODIPine-valsartan (EXFORGE) 5-320 MG per tablet, Take 1 tablet by mouth Daily., Disp: 90 tablet, Rfl: 3    doxycycline (VIBRAMYCIN) 100 MG capsule, Take 1 capsule by mouth 2 (Two) Times a Day., Disp: 20 capsule, Rfl: 0    ezetimibe (ZETIA) 10 MG tablet, 1 tablet Daily., Disp: , Rfl:     nebivolol (BYSTOLIC) 10 MG tablet, Take 1 tablet by mouth Daily., Disp: 90 tablet, Rfl: 3    rosuvastatin (Crestor) 10 MG tablet, Take 1 tablet by mouth Daily., Disp: 90 tablet, Rfl: 1    Semaglutide,0.25 or 0.5MG/DOS, (Ozempic, 0.25 or 0.5 MG/DOSE,) 2 MG/3ML solution pen-injector, Inject 0.5 mg under the skin into the appropriate area as directed 1 (One) Time Per Week., Disp: 3 mL, Rfl: 2  No current facility-administered medications for this visit.   (Not in a hospital admission)     Allergies: Cefdinir, Levofloxacin, Sulfamethoxazole, Trimethoprim, and Penicillins    Physical Exam  Constitutional:       Appearance: Normal appearance.   HENT:      Right Ear: Tympanic membrane, ear canal and external ear normal.      Left Ear: Tympanic membrane, ear canal and external ear normal.      Nose: Nose normal. Congestion present.      Comments: Maxillay sinuses  tender bilaterally.     Mouth/Throat:      Mouth: Mucous membranes are moist.      Pharynx: Oropharynx is clear.   Eyes:      Conjunctiva/sclera: Conjunctivae normal.      Pupils: Pupils are equal, round, and reactive to light.   Cardiovascular:      Rate and Rhythm: Normal rate and regular rhythm.      Heart sounds: Normal heart sounds.   Pulmonary:      Effort: Pulmonary effort is normal.      Breath sounds: Normal breath sounds.   Abdominal:      General: Abdomen is flat. Bowel sounds are normal.      Palpations: Abdomen is soft.   Skin:     General: Skin is warm.      Findings: No erythema or rash.   Neurological:      General: No focal deficit present.      Mental Status: She is alert and oriented to person, place, and time. Mental status is at baseline.   Psychiatric:         Mood and Affect: Mood normal.         Behavior: Behavior normal.         Thought Content: Thought content normal.         Judgment: Judgment normal.        Physical Exam  The right ear is bulging, indicating some pressure behind it. Throat was examined.  The lungs sound normal. No rattling or signs of pneumonia detected.    Result Review :          Results  Laboratory Studies  COVID-19 and influenza tests were negative.             Assessment and Plan    Diagnoses and all orders for this visit:    1. Acute non-recurrent maxillary sinusitis (Primary)  -     triamcinolone acetonide (KENALOG-40) injection 60 mg      Assessment & Plan  1. Sinus infection.  She reports persistent symptoms despite starting doxycycline on Friday. She will continue doxycycline. A steroid injection of Kenalog 60 mg will be administered to expedite symptom relief. She is advised to rest and not to exert herself if she still feels unwell tomorrow. A work excuse note will be provided for today and tomorrow.  Fluids and rest encouraged.  Risk of meds discussed and understood.  Education provided.  Return in 2 days if no improvement, sooner if worsens.  Return to  clinic or ED with any issues or concerns.    PROCEDURE  A steroid injection of Kenalog 60 mg was administered today.                    Follow Up   Return if symptoms worsen or fail to improve.  Patient was given instructions and counseling regarding her condition or for health maintenance advice. Please see specific information pulled into the AVS if appropriate.     Patient or patient representative verbalized consent for the use of Ambient Listening during the visit with  TOMASA Bethea for chart documentation. 1/27/2025  15:30 EST    TOMASA Bethea

## 2025-03-26 DIAGNOSIS — E78.5 HYPERLIPIDEMIA LDL GOAL <100: ICD-10-CM

## 2025-03-26 RX ORDER — ROSUVASTATIN CALCIUM 10 MG/1
10 TABLET, COATED ORAL DAILY
Qty: 90 TABLET | Refills: 1 | Status: SHIPPED | OUTPATIENT
Start: 2025-03-26

## 2025-04-02 DIAGNOSIS — E11.9 TYPE 2 DIABETES MELLITUS WITHOUT COMPLICATION, WITHOUT LONG-TERM CURRENT USE OF INSULIN: ICD-10-CM

## 2025-04-02 RX ORDER — SEMAGLUTIDE 0.68 MG/ML
0.5 INJECTION, SOLUTION SUBCUTANEOUS WEEKLY
Qty: 3 ML | Refills: 2 | Status: SHIPPED | OUTPATIENT
Start: 2025-04-02

## 2025-04-23 ENCOUNTER — TELEPHONE (OUTPATIENT)
Dept: FAMILY MEDICINE CLINIC | Facility: CLINIC | Age: 65
End: 2025-04-23

## 2025-04-23 NOTE — TELEPHONE ENCOUNTER
Caller: Jory Taylor    Relationship: Self    Best call back number: [phone]    What medication are you requesting:     SOMETHING FOR SYMPTOMS    What are your current symptoms:     BURNING DURING URINATION    How long have you been experiencing symptoms:     TWO DAYS     If a prescription is needed, what is your preferred pharmacy and phone number:      [id number] [place], [place] - [address] - [phone] [facility] [phone] FX

## 2025-04-23 NOTE — TELEPHONE ENCOUNTER
LM on  to call back.    HUB to relay message     Pt will need an appt to get medication prescribed. Has not been seen in office since 01/2025.

## 2025-05-23 ENCOUNTER — OFFICE VISIT (OUTPATIENT)
Dept: FAMILY MEDICINE CLINIC | Facility: CLINIC | Age: 65
End: 2025-05-23
Payer: COMMERCIAL

## 2025-05-23 ENCOUNTER — TELEPHONE (OUTPATIENT)
Dept: FAMILY MEDICINE CLINIC | Facility: CLINIC | Age: 65
End: 2025-05-23
Payer: COMMERCIAL

## 2025-05-23 VITALS
DIASTOLIC BLOOD PRESSURE: 84 MMHG | OXYGEN SATURATION: 98 % | BODY MASS INDEX: 27.48 KG/M2 | WEIGHT: 171 LBS | SYSTOLIC BLOOD PRESSURE: 120 MMHG | HEIGHT: 66 IN | HEART RATE: 110 BPM

## 2025-05-23 DIAGNOSIS — Z79.899 ENCOUNTER FOR LONG-TERM (CURRENT) USE OF MEDICATIONS: ICD-10-CM

## 2025-05-23 DIAGNOSIS — J30.89 NON-SEASONAL ALLERGIC RHINITIS DUE TO OTHER ALLERGIC TRIGGER: ICD-10-CM

## 2025-05-23 DIAGNOSIS — E55.9 VITAMIN D DEFICIENCY: ICD-10-CM

## 2025-05-23 DIAGNOSIS — E11.9 TYPE 2 DIABETES MELLITUS WITHOUT COMPLICATION, WITHOUT LONG-TERM CURRENT USE OF INSULIN: Primary | ICD-10-CM

## 2025-05-23 DIAGNOSIS — R09.81 NASAL CONGESTION: Primary | ICD-10-CM

## 2025-05-23 DIAGNOSIS — R05.1 ACUTE COUGH: ICD-10-CM

## 2025-05-23 PROCEDURE — 99214 OFFICE O/P EST MOD 30 MIN: CPT | Performed by: FAMILY MEDICINE

## 2025-05-23 PROCEDURE — 87428 SARSCOV & INF VIR A&B AG IA: CPT | Performed by: FAMILY MEDICINE

## 2025-05-23 RX ORDER — DEXTROMETHORPHAN HYDROBROMIDE AND PROMETHAZINE HYDROCHLORIDE 15; 6.25 MG/5ML; MG/5ML
5 SYRUP ORAL 4 TIMES DAILY PRN
Qty: 240 ML | Refills: 0 | Status: SHIPPED | OUTPATIENT
Start: 2025-05-23

## 2025-05-23 RX ORDER — PREDNISONE 20 MG/1
TABLET ORAL
Qty: 15 TABLET | Refills: 0 | Status: SHIPPED | OUTPATIENT
Start: 2025-05-23

## 2025-05-23 RX ORDER — DOXYCYCLINE 100 MG/1
100 CAPSULE ORAL 2 TIMES DAILY
Qty: 20 CAPSULE | Refills: 0 | Status: SHIPPED | OUTPATIENT
Start: 2025-05-23

## 2025-05-23 NOTE — PROGRESS NOTES
Follow Up Office Visit      Date of Visit:  2025   Patient Name: Jory Taylor  : 1960   MRN: [id number]     Chief Complaint:    Chief Complaint   Patient presents with    Nasal Congestion     Headache,watery eyes  X3 days         History of Present Illness: Jory Taylor is a 64 y.o. female who is here today for follow up.    History of Present Illness  The patient presents for evaluation of suspected allergies.    She has been experiencing symptoms for approximately 3 days, which she attributes to an allergic reaction. Symptoms include pressure, headache, and watery eyes, with a sensation of increased chest congestion. She reports coughing up slightly cloudy phlegm, but it is not green. She also experiences pressure on the left side of her head and discomfort in her ear and throat. Several colleagues at her workplace are experiencing similar symptoms, leading her to suspect an allergic cause. She has no history of strep infection.     She has been using Astepro nasal spray, which provides relief after 30 minutes. She has not taken Singulair. She has tried Claritin and Flonase, but they have not been effective. 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 She has not had a flare-up in 6 years. She can tolerate prednisone and doxycycline, but not penicillin, sulfa, Levaquin, or Bactrim.      Subjective      Review of Systems:   Review of Systems    Past Medical History:   Past Medical History:   Diagnosis Date    Acute non-recurrent maxillary sinusitis     Allergic 2000    I don't know    Anxiety     Benign hypertension     BMI 30.0-30.9,adult     CAD (coronary artery disease)     Chest pain     Cholelithiases     Diabetes     Dyslipidemia     Hypercholesterolemia     Irritable bowel disease     Migraine headache     Tobacco use         Past Surgical History:   Past Surgical History:   Procedure Laterality Date    ADENOIDECTOMY  1976    CHOLECYSTECTOMY  1999    HYSTERECTOMY      TUBAL ABDOMINAL LIGATION Bilateral 2002       Family History:   Family History   Problem Relation Age of Onset    Breast cancer Mother 81    Mitral valve prolapse Mother     Migraines Mother     Cancer Mother     Diabetes Father     Stroke Father     Hypertension Father     Coronary artery disease Father     Cancer Father     Breast cancer Maternal Aunt         unknown ? 40's    Breast cancer Maternal Aunt         2 great aunts unknown    Ovarian cancer Neg Hx        Social History:   Social History     Socioeconomic History    Marital status: Single   Tobacco Use    Smoking status: Every Day     Current packs/day: 0.50     Average packs/day: 0.5 packs/day for 44.4 years (22.2 ttl pk-yrs)     Types: Cigarettes     Start date: 1/1/1981     Passive exposure: Current    Smokeless tobacco: Never   Vaping Use    Vaping status: Never Used   Substance and Sexual Activity    Alcohol use: Not Currently    Drug use: Never    Sexual activity: Yes     Partners: Male     Birth control/protection: None       Medications:     Current Outpatient Medications:     amLODIPine-valsartan (EXFORGE) 5-320 MG per tablet, Take 1 tablet by mouth Daily., Disp: 90 tablet, Rfl: 3    ezetimibe (ZETIA) 10 MG tablet, 1 tablet Daily., Disp: , Rfl:     nebivolol (BYSTOLIC) 10 MG tablet, Take 1 tablet by mouth Daily., Disp: 90 tablet, Rfl: 3    rosuvastatin (CRESTOR) 10 MG tablet, TAKE 1 TABLET BY MOUTH EVERY DAY, Disp: 90 tablet, Rfl: 1    Semaglutide,0.25 or 0.5MG/DOS, (Ozempic, 0.25 or 0.5 MG/DOSE,) 2 MG/3ML solution pen-injector, INJECT 0.5 MG UNDER THE SKIN INTO THE APPROPRIATE AREA AS DIRECTED 1 (ONE) TIME PER WEEK, Disp: 3 mL, Rfl: 2    doxycycline (VIBRAMYCIN) 100 MG capsule, Take 1 capsule by mouth 2 (Two) Times a Day., Disp: 20 capsule, Rfl: 0    predniSONE (DELTASONE) 20 MG tablet, 2 po qd x  "5 days, 1 po qd x 5d, Disp: 15 tablet, Rfl: 0    promethazine-dextromethorphan (PROMETHAZINE-DM) 6.25-15 MG/5ML syrup, Take 5 mL by mouth 4 (Four) Times a Day As Needed for Cough., Disp: 240 mL, Rfl: 0    Allergies:   Allergies   Allergen Reactions    Cefdinir Unknown - High Severity    Levofloxacin Unknown - High Severity    Sulfamethoxazole Unknown - High Severity    Trimethoprim Unknown - High Severity    Penicillins Rash       Objective     Physical Exam:  Vital Signs:   Vitals:    05/23/25 1123   BP: 120/84   Pulse: 110   SpO2: 98%   Weight: 77.6 kg (171 lb)   Height: 167.6 cm (66\")     Body mass index is 27.6 kg/m².     Physical Exam  Vitals and nursing note reviewed.   Constitutional:       Appearance: She is obese.   HENT:      Head: Normocephalic and atraumatic.      Comments: 3+ rt pnd and 2+left pnd--sl red 1     Right Ear: Tympanic membrane, ear canal and external ear normal.      Left Ear: Tympanic membrane, ear canal and external ear normal.      Nose: No congestion or rhinorrhea.      Mouth/Throat:      Mouth: Mucous membranes are dry.   Eyes:      Extraocular Movements: Extraocular movements intact.      Conjunctiva/sclera: Conjunctivae normal.      Pupils: Pupils are equal, round, and reactive to light.   Cardiovascular:      Rate and Rhythm: Normal rate and regular rhythm.   Pulmonary:      Effort: Pulmonary effort is normal.      Breath sounds: Normal breath sounds.   Lymphadenopathy:      Cervical: No cervical adenopathy.   Skin:     General: Skin is warm and dry.   Neurological:      General: No focal deficit present.      Mental Status: She is alert.       Physical Exam  Ears: External ear canals and tympanic membranes intact  Mouth/Throat: 2-3+ posterior drainage signs, erythema  Cardiovascular: Tachycardia    Results  Labs   - COVID-19 test: Negative   - Influenza test: Negative    Procedures      Assessment / Plan      Assessment/Plan:   Diagnoses and all orders for this visit:    1. Nasal " congestion (Primary)  -     POCT SARS-CoV-2 + Flu Antigen NABIL    2. Non-seasonal allergic rhinitis due to other allergic trigger    3. Acute cough    Other orders  -     predniSONE (DELTASONE) 20 MG tablet; 2 po qd x 5 days, 1 po qd x 5d  Dispense: 15 tablet; Refill: 0  -     doxycycline (VIBRAMYCIN) 100 MG capsule; Take 1 capsule by mouth 2 (Two) Times a Day.  Dispense: 20 capsule; Refill: 0  -     promethazine-dextromethorphan (PROMETHAZINE-DM) 6.25-15 MG/5ML syrup; Take 5 mL by mouth 4 (Four) Times a Day As Needed for Cough.  Dispense: 240 mL; Refill: 0       Assessment & Plan  1. Allergic rhinitis.  - Symptoms include pressure, headache, watery eyes, and cloudy phlegm.  - COVID-19 and influenza tests were negative.  - Prednisone 40 mg daily for 5 days, followed by 20 mg daily for 5 days, has been prescribed. If there is no improvement after 2 days, Vibramycin will be initiated.  - Phenergan DM, 1 to 2 teaspoons every 6 hours, has been prescribed for cough management. She is advised to continue using Astepro nasal spray.    Follow Up:   No follow-ups on file.    Patient or patient representative verbalized consent for the use of Ambient Listening during the visit with  Quintin Gabriel MD for chart documentation. 5/23/2025  11:53 EDT     @[facility] [place]

## 2025-05-23 NOTE — TELEPHONE ENCOUNTER
Pt at check out said she missed her last follow up visit for diabetes with you and needs to do lab work and get in to see you soon to recheck as she does every 3 months does she need to see you I scheduled her lab work on 6/2/25 and an appointment with you to discuss the lab work on 6/6/25 if so will you please put lab orders in

## 2025-06-22 DIAGNOSIS — E11.9 TYPE 2 DIABETES MELLITUS WITHOUT COMPLICATION, WITHOUT LONG-TERM CURRENT USE OF INSULIN: ICD-10-CM

## 2025-06-23 DIAGNOSIS — I10 ESSENTIAL HYPERTENSION: ICD-10-CM

## 2025-06-23 RX ORDER — SEMAGLUTIDE 0.68 MG/ML
0.5 INJECTION, SOLUTION SUBCUTANEOUS WEEKLY
Qty: 2 ML | Refills: 0 | Status: SHIPPED | OUTPATIENT
Start: 2025-06-23 | End: 2025-06-26 | Stop reason: SDUPTHER

## 2025-06-23 RX ORDER — AMLODIPINE AND VALSARTAN 5; 320 MG/1; MG/1
1 TABLET ORAL DAILY
Qty: 90 TABLET | Refills: 3 | OUTPATIENT
Start: 2025-06-23

## 2025-06-25 ENCOUNTER — TELEPHONE (OUTPATIENT)
Dept: FAMILY MEDICINE CLINIC | Facility: CLINIC | Age: 65
End: 2025-06-25
Payer: COMMERCIAL

## 2025-06-25 NOTE — TELEPHONE ENCOUNTER
Called and spoke with patient on the phone. She has an appointment tomorrow and she is coming fasting. I am cancelling the orders in her chart from 05/28 and they will need to be re-ordered at her visit. Patient will bring up needing new lab orders

## 2025-06-26 ENCOUNTER — OFFICE VISIT (OUTPATIENT)
Dept: FAMILY MEDICINE CLINIC | Facility: CLINIC | Age: 65
End: 2025-06-26
Payer: COMMERCIAL

## 2025-06-26 VITALS
OXYGEN SATURATION: 99 % | WEIGHT: 170.38 LBS | HEART RATE: 79 BPM | DIASTOLIC BLOOD PRESSURE: 84 MMHG | SYSTOLIC BLOOD PRESSURE: 124 MMHG | HEIGHT: 66 IN | BODY MASS INDEX: 27.38 KG/M2

## 2025-06-26 DIAGNOSIS — F41.9 ANXIETY: ICD-10-CM

## 2025-06-26 DIAGNOSIS — Z12.4 SCREENING FOR CERVICAL CANCER: ICD-10-CM

## 2025-06-26 DIAGNOSIS — E55.9 VITAMIN D DEFICIENCY: ICD-10-CM

## 2025-06-26 DIAGNOSIS — E78.5 HYPERLIPIDEMIA LDL GOAL <100: ICD-10-CM

## 2025-06-26 DIAGNOSIS — Z12.2 SCREENING FOR LUNG CANCER: ICD-10-CM

## 2025-06-26 DIAGNOSIS — I10 ESSENTIAL HYPERTENSION: ICD-10-CM

## 2025-06-26 DIAGNOSIS — Z72.0 TOBACCO USE: ICD-10-CM

## 2025-06-26 DIAGNOSIS — Z00.00 ANNUAL PHYSICAL EXAM: Primary | ICD-10-CM

## 2025-06-26 DIAGNOSIS — Z79.899 ENCOUNTER FOR LONG-TERM (CURRENT) USE OF MEDICATIONS: ICD-10-CM

## 2025-06-26 DIAGNOSIS — E11.9 TYPE 2 DIABETES MELLITUS WITHOUT COMPLICATION, WITHOUT LONG-TERM CURRENT USE OF INSULIN: ICD-10-CM

## 2025-06-26 DIAGNOSIS — R79.89 ELEVATED FERRITIN: ICD-10-CM

## 2025-06-26 DIAGNOSIS — Z12.11 SCREENING FOR COLON CANCER: ICD-10-CM

## 2025-06-26 DIAGNOSIS — D72.829 LEUKOCYTOSIS, UNSPECIFIED TYPE: ICD-10-CM

## 2025-06-26 DIAGNOSIS — R82.90 NONSPECIFIC FINDING ON EXAMINATION OF URINE: ICD-10-CM

## 2025-06-26 DIAGNOSIS — E56.9 VITAMIN DEFICIENCY: ICD-10-CM

## 2025-06-26 DIAGNOSIS — Z12.31 ENCOUNTER FOR SCREENING MAMMOGRAM FOR MALIGNANT NEOPLASM OF BREAST: ICD-10-CM

## 2025-06-26 PROBLEM — Z11.59 NEED FOR HEPATITIS C SCREENING TEST: Status: RESOLVED | Noted: 2024-06-25 | Resolved: 2025-06-26

## 2025-06-26 PROBLEM — J01.00 ACUTE NON-RECURRENT MAXILLARY SINUSITIS: Status: RESOLVED | Noted: 2024-07-02 | Resolved: 2025-06-26

## 2025-06-26 LAB
BILIRUB BLD-MCNC: NEGATIVE MG/DL
CLARITY, POC: CLEAR
COLOR UR: YELLOW
EXPIRATION DATE: ABNORMAL
GLUCOSE UR STRIP-MCNC: NEGATIVE MG/DL
KETONES UR QL: NEGATIVE
LEUKOCYTE EST, POC: NEGATIVE
Lab: ABNORMAL
NITRITE UR-MCNC: NEGATIVE MG/ML
PH UR: 5 [PH] (ref 5–8)
PROT UR STRIP-MCNC: NEGATIVE MG/DL
RBC # UR STRIP: ABNORMAL /UL
SP GR UR: 1 (ref 1–1.03)
UROBILINOGEN UR QL: NORMAL

## 2025-06-26 RX ORDER — ROSUVASTATIN CALCIUM 10 MG/1
10 TABLET, COATED ORAL DAILY
Qty: 90 TABLET | Refills: 1 | Status: SHIPPED | OUTPATIENT
Start: 2025-06-26

## 2025-06-26 RX ORDER — SEMAGLUTIDE 0.68 MG/ML
0.5 INJECTION, SOLUTION SUBCUTANEOUS WEEKLY
Qty: 6 ML | Refills: 1 | Status: SHIPPED | OUTPATIENT
Start: 2025-06-26

## 2025-06-26 RX ORDER — AMLODIPINE AND VALSARTAN 5; 320 MG/1; MG/1
1 TABLET ORAL DAILY
Qty: 90 TABLET | Refills: 3 | Status: SHIPPED | OUTPATIENT
Start: 2025-06-26

## 2025-06-26 RX ORDER — NEBIVOLOL 10 MG/1
10 TABLET ORAL DAILY
Qty: 90 TABLET | Refills: 3 | Status: SHIPPED | OUTPATIENT
Start: 2025-06-26

## 2025-06-26 RX ORDER — EZETIMIBE 10 MG/1
10 TABLET ORAL DAILY
Qty: 90 TABLET | Refills: 3 | Status: SHIPPED | OUTPATIENT
Start: 2025-06-26

## 2025-06-26 NOTE — PROGRESS NOTES
Chief Complaint  Diabetes, Hypertension, and Hyperlipidemia    Subjective          Joyr Taylor presents to [facility]  Diabetes  Associated symptoms:     no polydipsia, no polyphagia and no polyuria    Hypertension  Hyperlipidemia      History of Present Illness  The patient is a 65-year-old female who presents for an annual exam. She has a history of hypertension, hyperlipidemia, type 2 diabetes, vitamin deficiency, elevated white blood cell count, elevated ferritin, and anxiety. Her current medications include amlodipine, valsartan, Zetia, nebivolol, rosuvastatin, and Ozempic. She reports feet are fine with no cuts or sores. Her annual eye exam is up to date. Her mammogram is up to date, and she will be due for a repeat yearly mammogram after 07/31/2025. She is due for a colonoscopy, a Pap smear, and a low-dose lung CT scan. She continues to smoke and does not use alcohol. She tries to watch her diet and stays active. She reports no dizziness, headache, chest pain, chest pressure, shortness of breath, trouble breathing, or urinary or bowel issues.    She reports feeling well overall but acknowledges recent unhealthy eating habits due to social events. She anticipates that her blood sugar levels may be elevated. She maintains a regular exercise routine during the summer months and reports satisfactory blood sugar control. Her diet primarily consists of water, tea, and juice, with minimal soda intake. She reports no foot infections or other complications related to her diabetes.    Continues to follow-up annually with cardiology Dr. De La Paz. She is currently under the care of Dr. Lopez for her blood count management as well as history of elevated ferritin levels. States she see's him every 6 months.     She has expressed interest in undergoing a lung scan and colonoscopy. She had an eye exam today and got two pairs of glasses. She has cataracts but they are not bothering  "her vision. She has not had a Pap smear recently and is seeking a referral for this procedure.    She knows that she will be due a mammogram after July 31, 2025 and states that she will schedule this herself.  She states she will also schedule her low-dose lung CT scan herself as well.    SOCIAL HISTORY  She does continue to smoke. No alcohol use.    Patient Care Team:  Popeye Carmichael APRN as PCP - General (Nurse Practitioner)  Vianca Long APRN as Nurse Practitioner (Hematology and Oncology)  Juan De La Paz MD as Consulting Physician (Cardiology)     Objective   Vital Signs:   /84   Pulse 79   Ht 167.6 cm (66\")   Wt 77.3 kg (170 lb 6 oz)   SpO2 99%   BMI 27.50 kg/m²     Body mass index is 27.5 kg/m².    Review of Systems   Constitutional: Negative.    HENT: Negative.     Eyes: Negative.    Respiratory: Negative.     Cardiovascular: Negative.    Gastrointestinal: Negative.    Endocrine: Negative for polydipsia, polyphagia and polyuria.   Genitourinary: Negative.    Musculoskeletal: Negative.    Skin: Negative.    Neurological: Negative.    Psychiatric/Behavioral: Negative.         Past History:  Medical History: has a past medical history of Acute non-recurrent maxillary sinusitis, Allergic (01/2000), Anxiety, Benign hypertension, BMI 30.0-30.9,adult, CAD (coronary artery disease), Chest pain, Cholelithiases, Diabetes, Dyslipidemia, Hypercholesterolemia, Irritable bowel disease, Migraine headache, and Tobacco use.   Surgical History: has a past surgical history that includes Cholecystectomy (1999); Tubal ligation (Bilateral, 2002); Hysterectomy; and Adenoidectomy (1976).   Family History: family history includes Breast cancer in her maternal aunt and maternal aunt; Breast cancer (age of onset: 81) in her mother; Cancer in her father and mother; Coronary artery disease in her father; Diabetes in her father; Hypertension in her father; Migraines in her mother; Mitral valve prolapse in her " mother; Stroke in her father.   Social History: reports that she has been smoking cigarettes. She started smoking about 44 years ago. She has a 22.2 pack-year smoking history. She has been exposed to tobacco smoke. She has never used smokeless tobacco. She reports that she does not currently use alcohol. She reports that she does not use drugs.    PHQ-2 Depression Screening  Little interest or pleasure in doing things? Not at all   Feeling down, depressed, or hopeless? Not at all   PHQ-2 Total Score 0       PHQ-9 Depression Screening  Little interest or pleasure in doing things? Not at all   Feeling down, depressed, or hopeless? Not at all   PHQ-2 Total Score 0   Trouble falling or staying asleep, or sleeping too much?     Feeling tired or having little energy?     Poor appetite or overeating?     Feeling bad about yourself - or that you are a failure or have let yourself or your family down?     Trouble concentrating on things, such as reading the newspaper or watching television?     Moving or speaking so slowly that other people could have noticed? Or the opposite - being so fidgety or restless that you have been moving around a lot more than usual?     Thoughts that you would be better off dead, or of hurting yourself in some way?     PHQ-9 Total Score     If you checked off any problems, how difficult have these problems made it for you to do your work, take care of things at home, or get along with other people? Not difficult at all        PHQ-9 Total Score:        Patient screened positive for depression based on a PHQ-9 score of  on . Follow-up recommendations include:          Current Outpatient Medications:     amLODIPine-valsartan (EXFORGE) 5-320 MG per tablet, Take 1 tablet by mouth Daily., Disp: 90 tablet, Rfl: 3    ezetimibe (ZETIA) 10 MG tablet, Take 1 tablet by mouth Daily., Disp: 90 tablet, Rfl: 3    nebivolol (BYSTOLIC) 10 MG tablet, Take 1 tablet by mouth Daily., Disp: 90 tablet, Rfl: 3     rosuvastatin (CRESTOR) 10 MG tablet, Take 1 tablet by mouth Daily., Disp: 90 tablet, Rfl: 1    Semaglutide,0.25 or 0.5MG/DOS, (Ozempic, 0.25 or 0.5 MG/DOSE,) 2 MG/3ML solution pen-injector, Inject 0.5 mg under the skin into the appropriate area as directed 1 (One) Time Per Week., Disp: 6 mL, Rfl: 1   (Not in a hospital admission)     Allergies: Cefdinir, Levofloxacin, Sulfamethoxazole, Trimethoprim, and Penicillins    Physical Exam  Constitutional:       Appearance: Normal appearance.   HENT:      Head: Normocephalic.      Right Ear: Tympanic membrane, ear canal and external ear normal.      Left Ear: Tympanic membrane, ear canal and external ear normal.      Nose: Nose normal.      Mouth/Throat:      Mouth: Mucous membranes are moist.      Pharynx: Oropharynx is clear.   Eyes:      Extraocular Movements: Extraocular movements intact.      Conjunctiva/sclera: Conjunctivae normal.      Pupils: Pupils are equal, round, and reactive to light.   Cardiovascular:      Rate and Rhythm: Normal rate and regular rhythm.      Heart sounds: Normal heart sounds.   Pulmonary:      Effort: Pulmonary effort is normal.      Breath sounds: Normal breath sounds.   Abdominal:      General: Abdomen is flat. Bowel sounds are normal.      Palpations: Abdomen is soft.   Genitourinary:     Comments: Denied   Musculoskeletal:         General: Normal range of motion.      Cervical back: Normal range of motion.   Skin:     General: Skin is warm.      Findings: No erythema or rash.   Neurological:      General: No focal deficit present.      Mental Status: She is alert and oriented to person, place, and time. Mental status is at baseline.   Psychiatric:         Mood and Affect: Mood normal.         Behavior: Behavior normal.         Thought Content: Thought content normal.         Judgment: Judgment normal.        Physical Exam  Respiratory: Clear to auscultation, no wheezing, rales or rhonchi  Gastrointestinal: Soft, no tenderness, no  distention, no masses    Result Review :          Results               Assessment and Plan    Diagnoses and all orders for this visit:    1. Annual physical exam (Primary)  -     CBC & Differential  -     Comprehensive Metabolic Panel  -     TSH Rfx On Abnormal To Free T4  -     POC Urinalysis Dipstick, Automated; Future    2. Hyperlipidemia LDL goal <100  -     Lipid Panel  -     rosuvastatin (CRESTOR) 10 MG tablet; Take 1 tablet by mouth Daily.  Dispense: 90 tablet; Refill: 1  -     ezetimibe (ZETIA) 10 MG tablet; Take 1 tablet by mouth Daily.  Dispense: 90 tablet; Refill: 3    3. Essential hypertension  -     nebivolol (BYSTOLIC) 10 MG tablet; Take 1 tablet by mouth Daily.  Dispense: 90 tablet; Refill: 3  -     amLODIPine-valsartan (EXFORGE) 5-320 MG per tablet; Take 1 tablet by mouth Daily.  Dispense: 90 tablet; Refill: 3    4. Vitamin deficiency  -     Vitamin B12    5. Vitamin D deficiency  -     Vitamin D,25-Hydroxy    6. Type 2 diabetes mellitus without complication, without long-term current use of insulin  -     Hemoglobin A1c  -     Microalbumin / Creatinine Urine Ratio - Urine, Clean Catch  -     Semaglutide,0.25 or 0.5MG/DOS, (Ozempic, 0.25 or 0.5 MG/DOSE,) 2 MG/3ML solution pen-injector; Inject 0.5 mg under the skin into the appropriate area as directed 1 (One) Time Per Week.  Dispense: 6 mL; Refill: 1    7. Screening for colon cancer  -     Ambulatory Referral For Screening Colonoscopy    8. Screening for cervical cancer  -     Ambulatory Referral to Gynecology    9. Encounter for screening mammogram for malignant neoplasm of breast    10. Encounter for long-term (current) use of medications    11. Leukocytosis, unspecified type    12. Anxiety    13. Elevated ferritin  -     Iron  -     Ferritin    14. Tobacco use    15. Screening for lung cancer      Assessment & Plan  1. Annual physical examination.  - Mammogram due after 07/31/2025, she states she will schedule.  - Will place order for  colonoscopy.  - Comprehensive blood work to assess liver function, kidney function, cholesterol levels, thyroid function, ferritin levels, iron levels, vitamin B levels, and vitamin D levels.  - Urine sample to be collected today.  Culture sent.  Call in 2 days for results.  Return in 1 to 2 weeks for repeat UA.  - Continue current medications.  She is asking if we can take over filling all of her medications.  She will continue to follow-up with cardiology and hematology.  - Goal blood pressure less than 140/90.  Let me or cardiology know if gets to or above that.  PHQ-9 completed and discussed.  No thoughts of suicide or hurting herself or anyone else.  Proper diet and exercise plan discussed and encouraged.  - She states she will call and schedule her own low-dose lung CT scan.  She knows that she is due.  She will let me know if there is any issues getting this scheduled.  Smoking cessation discussed and encouraged.  - She denies all immunizations including pneumonia shingles tetanus COVID and understands the risks.  - Check feet daily.  Annual dental and eye exams encouraged.  - Risk of meds discussed and understood.  Education provided.  Return to clinic or ED with any issues or concerns.    2. Hypertension.  - Currently on amlodipine, valsartan, and nebivolol.  - Blood pressure medications appear effective.  - Refills for these medications will be sent to pharmacy.  - No recent issues reported.  - Continue to follow-up with cardiology.    3. Hyperlipidemia.  - Currently on Zetia and rosuvastatin.  - Lipid-lowering medications appear effective.  - Refills for these medications will be sent to pharmacy.  - No recent issues reported.    4. Type 2 diabetes mellitus.  - Currently on Ozempic.  - Blood sugar levels have been good, despite recent dietary indulgences.  - Refills for this medication will be sent to pharmacy.  - No cuts or sores reported.    5. Anxiety..  - No new symptoms reported.  - No recent issues  reported.    6. Vitamin deficiency.  - Comprehensive blood work to assess vitamin B and vitamin D levels.  - Patient remains active and tries to watch diet.  - No recent issues reported.  - No changes in medication or therapy.    7. Elevated white blood cell count.  - Comprehensive blood work to reassess white blood cell count.  - No recent infections or illnesses reported.  - No changes in medication or therapy.  - No recent issues reported.  - Continue to follow-up with hematology.    8. Elevated ferritin.  - Comprehensive blood work to reassess ferritin levels.  - No recent issues reported.  - No changes in medication or therapy.  - No recent issues reported.  - Continue to follow-up with hematology.    9. Health maintenance.  - Mammogram due after 07/31/2025.  - Order for colonoscopy will be placed, patient to be contacted for scheduling.  - Regular mammograms and lung scans to continue.  She states she will schedule both of these things herself.  - Referral to OB/GYN for Pap smear will be arranged.  - Patient denies all immunizations including pneumonia, shingles, tetanus, and COVID, and understands the risk.             Discussed lung cancer screening recommendations with the patient using the Lung Cancer Screening shared decision-making tool, including benefits and risks of a low-dose lung CT scan. The patient has declined screening at this time. Will revisit at future visits as appropriate.    Follow Up   Return in about 6 months (around 12/26/2025).  Patient was given instructions and counseling regarding her condition or for health maintenance advice. Please see specific information pulled into the AVS if appropriate.     Patient or patient representative verbalized consent for the use of Ambient Listening during the visit with  TOMASA Bethea for chart documentation. 6/26/2025  12:05 EDT    TOMASA Bethea

## 2025-06-27 ENCOUNTER — TELEPHONE (OUTPATIENT)
Dept: CARDIOLOGY | Facility: CLINIC | Age: 65
End: 2025-06-27
Payer: COMMERCIAL

## 2025-06-27 LAB
25(OH)D3+25(OH)D2 SERPL-MCNC: 29.8 NG/ML (ref 30–100)
ALBUMIN SERPL-MCNC: 4.6 G/DL (ref 3.9–4.9)
ALBUMIN/CREAT UR: 18 MG/G CREAT (ref 0–29)
ALP SERPL-CCNC: 104 IU/L (ref 44–121)
ALT SERPL-CCNC: 18 IU/L (ref 0–32)
AST SERPL-CCNC: 28 IU/L (ref 0–40)
BASOPHILS # BLD AUTO: 0.1 X10E3/UL (ref 0–0.2)
BASOPHILS NFR BLD AUTO: 1 %
BILIRUB SERPL-MCNC: 0.4 MG/DL (ref 0–1.2)
BUN SERPL-MCNC: 12 MG/DL (ref 8–27)
BUN/CREAT SERPL: 14 (ref 12–28)
CALCIUM SERPL-MCNC: 10.4 MG/DL (ref 8.7–10.3)
CHLORIDE SERPL-SCNC: 104 MMOL/L (ref 96–106)
CHOLEST SERPL-MCNC: 139 MG/DL (ref 100–199)
CO2 SERPL-SCNC: 21 MMOL/L (ref 20–29)
CREAT SERPL-MCNC: 0.83 MG/DL (ref 0.57–1)
CREAT UR-MCNC: 17.2 MG/DL
EGFRCR SERPLBLD CKD-EPI 2021: 79 ML/MIN/1.73
EOSINOPHIL # BLD AUTO: 0.3 X10E3/UL (ref 0–0.4)
EOSINOPHIL NFR BLD AUTO: 3 %
ERYTHROCYTE [DISTWIDTH] IN BLOOD BY AUTOMATED COUNT: 15.4 % (ref 11.7–15.4)
FERRITIN SERPL-MCNC: 309 NG/ML (ref 15–150)
GLOBULIN SER CALC-MCNC: 2.6 G/DL (ref 1.5–4.5)
GLUCOSE SERPL-MCNC: 89 MG/DL (ref 70–99)
HBA1C MFR BLD: 6 % (ref 4.8–5.6)
HCT VFR BLD AUTO: 47.7 % (ref 34–46.6)
HDLC SERPL-MCNC: 61 MG/DL
HGB BLD-MCNC: 14.6 G/DL (ref 11.1–15.9)
IMM GRANULOCYTES # BLD AUTO: 0 X10E3/UL (ref 0–0.1)
IMM GRANULOCYTES NFR BLD AUTO: 0 %
IRON SERPL-MCNC: 73 UG/DL (ref 27–139)
LDLC SERPL CALC-MCNC: 54 MG/DL (ref 0–99)
LYMPHOCYTES # BLD AUTO: 3.2 X10E3/UL (ref 0.7–3.1)
LYMPHOCYTES NFR BLD AUTO: 30 %
MCH RBC QN AUTO: 29.7 PG (ref 26.6–33)
MCHC RBC AUTO-ENTMCNC: 30.6 G/DL (ref 31.5–35.7)
MCV RBC AUTO: 97 FL (ref 79–97)
MICROALBUMIN UR-MCNC: 3.1 UG/ML
MONOCYTES # BLD AUTO: 0.8 X10E3/UL (ref 0.1–0.9)
MONOCYTES NFR BLD AUTO: 7 %
NEUTROPHILS # BLD AUTO: 6.4 X10E3/UL (ref 1.4–7)
NEUTROPHILS NFR BLD AUTO: 59 %
PLATELET # BLD AUTO: 290 X10E3/UL (ref 150–450)
POTASSIUM SERPL-SCNC: 4.9 MMOL/L (ref 3.5–5.2)
PROT SERPL-MCNC: 7.2 G/DL (ref 6–8.5)
RBC # BLD AUTO: 4.91 X10E6/UL (ref 3.77–5.28)
SODIUM SERPL-SCNC: 142 MMOL/L (ref 134–144)
TRIGL SERPL-MCNC: 138 MG/DL (ref 0–149)
TSH SERPL DL<=0.005 MIU/L-ACNC: 1.17 UIU/ML (ref 0.45–4.5)
VIT B12 SERPL-MCNC: 368 PG/ML (ref 232–1245)
VLDLC SERPL CALC-MCNC: 24 MG/DL (ref 5–40)
WBC # BLD AUTO: 10.8 X10E3/UL (ref 3.4–10.8)

## 2025-06-27 NOTE — TELEPHONE ENCOUNTER
Attempted to reach pt to reschedule cancelled appt from 6/26. Call went to voicemail; left a message for pt to call back and make new appointment.  HUB OKAY TO SCHEDULE FIRST AVAILABLE IF PT CALLS BACK.

## 2025-07-01 ENCOUNTER — RESULTS FOLLOW-UP (OUTPATIENT)
Dept: FAMILY MEDICINE CLINIC | Facility: CLINIC | Age: 65
End: 2025-07-01
Payer: COMMERCIAL

## 2025-07-01 DIAGNOSIS — R71.8 ELEVATED HEMATOCRIT: Primary | ICD-10-CM

## 2025-07-01 DIAGNOSIS — E83.52 SERUM CALCIUM ELEVATED: ICD-10-CM

## 2025-07-01 LAB
BACTERIA UR CULT: ABNORMAL
BACTERIA UR CULT: ABNORMAL
OTHER ANTIBIOTIC SUSC ISLT: ABNORMAL

## 2025-07-01 NOTE — TELEPHONE ENCOUNTER
LM on  to call back.    HUB to relay message from Popeye    Please let patient know from labs her hematocrit which pertains to the red blood cells is mildly elevated.  Lymphocytes which are type of white blood cell mildly elevated at 3.2 but stable.  Would recommend we monitor this and recheck CBC in 3 to 4 weeks to make sure the hematocrit does not continue to increase.     Calcium level is again mildly elevated.  Cut back on any supplements she is taking that contain calcium.  Would also recommend we monitor this and recheck in 3 to 4 weeks as well.     Diabetes has remained stable with an A1c of 6.0.  Continue current medications and continue to encourage healthy diet limiting intake of sugar sweets carbs starches and 30 minutes exercise most days of the week.     Vitamin D low but has improved over the past 5 months.  Recommend she be taking 2000 units daily of an over-the-counter vitamin D supplement.     Her ferritin level is again elevated but it is stable.  Recommend she continue to follow-up and monitor this with her hematologist.     Remaining labs unremarkable.  Thanks

## 2025-07-02 ENCOUNTER — TELEPHONE (OUTPATIENT)
Dept: FAMILY MEDICINE CLINIC | Facility: CLINIC | Age: 65
End: 2025-07-02

## 2025-07-02 RX ORDER — DOXYCYCLINE 100 MG/1
100 CAPSULE ORAL 2 TIMES DAILY
Qty: 20 CAPSULE | Refills: 0 | Status: SHIPPED | OUTPATIENT
Start: 2025-07-02

## 2025-07-02 NOTE — TELEPHONE ENCOUNTER
Caller: Jory Taylor    Relationship: Self    Best call back number: [phone]    What is the best time to reach you: ANYTIME    Who are you requesting to speak with (clinical staff, provider,  specific staff member): PROVIDER OR CLINICAL STAFF    What was the call regarding: PATIENT STATES THAT HER URINE CULTURE SHOWED THAT SHE HAS AN UTI & WOULD LIKE TO KNOW IF SHE WILL BE PRESCRIBED MEDICATION. PLEASE ADVISE    Is it okay if the provider responds through MyChart: NO

## 2025-07-02 NOTE — TELEPHONE ENCOUNTER
Name: Jory Taylor    Relationship: Self    Best Callback Number: [phone]    HUB PROVIDED THE RELAY MESSAGE FROM THE OFFICE   PATIENT VOICED UNDERSTANDING AND HAS NO FURTHER QUESTIONS AT THIS TIME    ADDITIONAL INFORMATION:

## 2025-07-10 ENCOUNTER — OFFICE VISIT (OUTPATIENT)
Dept: FAMILY MEDICINE CLINIC | Facility: CLINIC | Age: 65
End: 2025-07-10
Payer: COMMERCIAL

## 2025-07-10 VITALS
TEMPERATURE: 98.8 F | HEART RATE: 102 BPM | SYSTOLIC BLOOD PRESSURE: 128 MMHG | WEIGHT: 173.7 LBS | OXYGEN SATURATION: 97 % | HEIGHT: 66 IN | BODY MASS INDEX: 27.92 KG/M2 | DIASTOLIC BLOOD PRESSURE: 70 MMHG

## 2025-07-10 DIAGNOSIS — E11.9 TYPE 2 DIABETES MELLITUS WITHOUT COMPLICATION, WITHOUT LONG-TERM CURRENT USE OF INSULIN: ICD-10-CM

## 2025-07-10 DIAGNOSIS — T78.40XA ALLERGIC REACTION, INITIAL ENCOUNTER: Primary | ICD-10-CM

## 2025-07-10 DIAGNOSIS — Z87.2 HISTORY OF STEVENS-JOHNSON TOXIC EPIDERMAL NECROLYSIS OVERLAP SYNDROME: ICD-10-CM

## 2025-07-10 RX ORDER — FLUOCINONIDE 0.5 MG/G
1 OINTMENT TOPICAL 2 TIMES DAILY PRN
Qty: 60 G | Refills: 1 | Status: SHIPPED | OUTPATIENT
Start: 2025-07-10

## 2025-07-10 RX ORDER — TRIAMCINOLONE ACETONIDE 40 MG/ML
80 INJECTION, SUSPENSION INTRA-ARTICULAR; INTRAMUSCULAR ONCE
Status: COMPLETED | OUTPATIENT
Start: 2025-07-10 | End: 2025-07-10

## 2025-07-10 RX ORDER — TRIAMCINOLONE ACETONIDE 0.1 %
PASTE (GRAM) DENTAL 2 TIMES DAILY PRN
Qty: 5 G | Refills: 1 | Status: SHIPPED | OUTPATIENT
Start: 2025-07-10

## 2025-07-10 RX ORDER — PREDNISONE 20 MG/1
TABLET ORAL
Qty: 18 TABLET | Refills: 0 | Status: SHIPPED | OUTPATIENT
Start: 2025-07-10 | End: 2025-07-20

## 2025-07-10 RX ADMIN — TRIAMCINOLONE ACETONIDE 80 MG: 40 INJECTION, SUSPENSION INTRA-ARTICULAR; INTRAMUSCULAR at 15:42

## 2025-07-12 ENCOUNTER — TELEPHONE (OUTPATIENT)
Dept: FAMILY MEDICINE CLINIC | Facility: CLINIC | Age: 65
End: 2025-07-12
Payer: COMMERCIAL

## 2025-07-13 NOTE — PROGRESS NOTES
"Chief Complaint  Rash (Swelling and redness since taking doxycline has Gamaliel Deo /Mouth feels swollen and thick /Feels like body is on fire  /Also eyes feel gritty /)    Subjective      Jory Taylor presents to [facility]  Rash  Associated symptoms: no congestion, no cough, no diarrhea, no fever, no shortness of breath, no sore throat and no vomiting      Patient is not known to me, but she says that she has had recurrent problems with Cota-Deo syndrome over the years, usually after taking an antibiotic, although she says she was only hospitalized once with the first episode in 2017..  She says she has been managed by a dermatologist, Epi Harris, who typically treats her very aggressively at the onset of symptoms with high-dose steroids, including intramuscular and oral along with topical steroids as needed.  She indicates that her last appointment with him was about 2022, and she did provide a detailed history, describing the sloughing of skin making it painful to walk.    The patient was recently put on doxycycline for a possible urinary tract infection as she had a trace of blood in the urine and had a positive culture, although she did not have any urinary tract infection symptoms.  After taking the doxycycline for couple days, she began having symptoms of the rash earlier today, with an odd feeling on her oral mucosa as well, some itching and some discomfort but no vesicles or ulcerations at this time.  She has not had any significant wheezing or shortness of breath or angioedema symptoms.  Objective   Vital Signs:   Vitals:    07/10/25 1459   BP: 128/70   BP Location: Left arm   Patient Position: Sitting   Cuff Size: Adult   Pulse: 102   Temp: 98.8 °F (37.1 °C)   TempSrc: Oral   SpO2: 97%   Weight: 78.8 kg (173 lb 11.2 oz)   Height: 167.6 cm (66\")      /70 (BP Location: Left arm, Patient Position: Sitting, Cuff Size: Adult)   Pulse 102   Temp 98.8 " "°F (37.1 °C) (Oral)   Ht 167.6 cm (66\")   Wt 78.8 kg (173 lb 11.2 oz)   SpO2 97%   BMI 28.04 kg/m²     Body mass index is 28.04 kg/m².    Review of Systems   Constitutional:  Negative for chills and fever.   HENT:  Negative for congestion, ear pain, mouth sores, nosebleeds, sore throat, swollen glands, trouble swallowing and voice change.    Eyes:  Positive for redness. Negative for discharge and visual disturbance.   Respiratory:  Negative for cough, chest tightness, shortness of breath, wheezing and stridor.    Cardiovascular:  Negative for chest pain, palpitations and leg swelling.   Gastrointestinal:  Positive for nausea (Some mild nausea). Negative for blood in stool, constipation, diarrhea and vomiting.   Genitourinary:  Negative for dysuria, flank pain, frequency, genital sores, hematuria, vaginal bleeding and vaginal discharge.   Musculoskeletal:  Negative for arthralgias, back pain, joint swelling, myalgias and neck pain.   Skin:  Positive for rash.   Neurological:  Positive for light-headedness. Negative for dizziness, seizures, syncope, speech difficulty and headache.   Hematological:  Negative for adenopathy.       Past History:  Medical History: has a past medical history of Acute non-recurrent maxillary sinusitis, Allergic (01/2000), Anxiety, Benign hypertension, BMI 30.0-30.9,adult, CAD (coronary artery disease), Chest pain, Cholelithiases, Diabetes, Dyslipidemia, Hypercholesterolemia, Irritable bowel disease, Migraine headache, and Tobacco use.   Surgical History: has a past surgical history that includes Cholecystectomy (1999); Tubal ligation (Bilateral, 2002); Hysterectomy; and Adenoidectomy (1976).   Family History: family history includes Breast cancer in her maternal aunt and maternal aunt; Breast cancer (age of onset: 81) in her mother; Cancer in her father and mother; Coronary artery disease in her father; Diabetes in her father; Hypertension in her father; Migraines in her mother; Mitral " valve prolapse in her mother; Stroke in her father.   Social History: reports that she has been smoking cigarettes. She started smoking about 44 years ago. She has a 22.3 pack-year smoking history. She has been exposed to tobacco smoke. She has never used smokeless tobacco. She reports that she does not currently use alcohol. She reports that she does not use drugs.      Current Outpatient Medications:     amLODIPine-valsartan (EXFORGE) 5-320 MG per tablet, Take 1 tablet by mouth Daily., Disp: 90 tablet, Rfl: 3    ezetimibe (ZETIA) 10 MG tablet, Take 1 tablet by mouth Daily., Disp: 90 tablet, Rfl: 3    nebivolol (BYSTOLIC) 10 MG tablet, Take 1 tablet by mouth Daily., Disp: 90 tablet, Rfl: 3    rosuvastatin (CRESTOR) 10 MG tablet, Take 1 tablet by mouth Daily., Disp: 90 tablet, Rfl: 1    Semaglutide,0.25 or 0.5MG/DOS, (Ozempic, 0.25 or 0.5 MG/DOSE,) 2 MG/3ML solution pen-injector, Inject 0.5 mg under the skin into the appropriate area as directed 1 (One) Time Per Week., Disp: 6 mL, Rfl: 1    fluocinonide (LIDEX) 0.05 % ointment, Apply 1 Application topically to the appropriate area as directed 2 (Two) Times a Day As Needed for Rash., Disp: 60 g, Rfl: 1    predniSONE (DELTASONE) 20 MG tablet, Take 3 tablets by mouth Daily for 2 days, THEN 2 tablets Daily for 4 days, THEN 1 tablet Daily for 4 days., Disp: 18 tablet, Rfl: 0    triamcinolone (KENALOG) 0.1 % paste, Apply  to teeth 2 (Two) Times a Day As Needed for Mucositis (mouth sores)., Disp: 5 g, Rfl: 1    Allergies: Cefdinir, Keflex [cephalexin], Levofloxacin, Sulfamethoxazole, Trimethoprim, Doxycycline, and Penicillins    Physical Exam  Constitutional:       General: She is in acute distress (In some mild distress, seemingly anxious mostly and uncomfortable).      Appearance: She is not toxic-appearing.   HENT:      Head: Normocephalic and atraumatic.      Right Ear: Tympanic membrane, ear canal and external ear normal.      Left Ear: Tympanic membrane, ear canal  and external ear normal.      Nose: Nose normal. No congestion or rhinorrhea.      Mouth/Throat:      Mouth: Mucous membranes are moist.      Pharynx: Oropharynx is clear. No oropharyngeal exudate or posterior oropharyngeal erythema.   Eyes:      General: Lids are normal. No scleral icterus.     Extraocular Movements: Extraocular movements intact.      Conjunctiva/sclera:      Right eye: Right conjunctiva is injected. No exudate.     Left eye: Left conjunctiva is injected. No exudate.     Pupils: Pupils are equal, round, and reactive to light.   Neck:      Vascular: No carotid bruit.   Cardiovascular:      Rate and Rhythm: Normal rate and regular rhythm.      Pulses: Normal pulses.      Heart sounds: Normal heart sounds. No murmur heard.     No gallop.   Pulmonary:      Effort: Pulmonary effort is normal.      Breath sounds: Normal breath sounds. No wheezing, rhonchi or rales.   Chest:      Chest wall: No tenderness.   Abdominal:      General: Bowel sounds are normal. There is no distension.      Palpations: Abdomen is soft. There is no mass.      Tenderness: There is no abdominal tenderness. There is no guarding or rebound.   Musculoskeletal:         General: No swelling or deformity. Normal range of motion.      Cervical back: No rigidity.      Right lower leg: No edema.      Left lower leg: No edema.   Lymphadenopathy:      Cervical: No cervical adenopathy.   Skin:     General: Skin is warm and dry.      Capillary Refill: Capillary refill takes less than 2 seconds.      Coloration: Skin is not jaundiced.      Findings: Rash present. No bruising.      Comments: Faint, mild scattered erythematous maculopapular rash on extremities and torso, with no purpura, petechia, or vesicles seen   Neurological:      General: No focal deficit present.      Mental Status: She is alert and oriented to person, place, and time.      Cranial Nerves: No cranial nerve deficit.      Motor: No weakness.      Coordination: Coordination  normal.      Gait: Gait normal.   Psychiatric:         Attention and Perception: Attention and perception normal.         Mood and Affect: Mood is anxious. Mood is not depressed.         Speech: Speech normal.         Behavior: Behavior normal.         Thought Content: Thought content normal.         Cognition and Memory: Cognition and memory normal.         Judgment: Judgment normal.                   Assessment and Plan   Diagnoses and all orders for this visit:    1. Allergic reaction, initial encounter (Primary)  -     triamcinolone acetonide (KENALOG-40) injection 80 mg  The patient does report a detailed history of having Cota-Deo syndrome, but unfortunately we do not have any records of this, and we had great difficulty getting any records from her dermatologist on the day of the visit in spite of multiple attempts.  His office told us they had no records at all, and we asked her to contact them and try to get things straightened out.  At the time the note is being entered, I will note that we did get a copy of her dermatology records the next day, and Dr. Epi Harris's notes do not mention Cota-Deo syndrome specifically but do report rather severe allergic reaction requiring high-dose systemic steroids with the development of sloughing of the skin, particularly on the soles of her feet causing a lot of pain when walking.  The day of the visit the patient was given Kenalog IM and placed on oral prednisone, along with topical cortisone cream to use as needed, and advised that if she got worse in any way whatsoever then she should go to the emergency department immediately, as most patients with Cota-Deo's to rule require hospitalization with interdisciplinary care.  We will work on getting records from the [facility] [place] from her initial admission for this in 2017 as well.  The patient does have diabetes, so she understands the risks of taking the steroids but says that her glucose has  been well-controlled lately, and she will monitor carefully.  She will stop the doxycycline, and I would not recommend replacing with anything since she really did not have any UTI symptoms and only had a trace of blood on the urinalysis.  If she does develop any urinary tract symptoms, then she will let us know.  She has been advised by her usual PCP to have the urine retested in a couple of weeks  2. History of Cota-Deo toxic epidermal necrolysis overlap syndrome    3. Type 2 diabetes mellitus without complication, without long-term current use of insulin    Other orders  -     predniSONE (DELTASONE) 20 MG tablet; Take 3 tablets by mouth Daily for 2 days, THEN 2 tablets Daily for 4 days, THEN 1 tablet Daily for 4 days.  Dispense: 18 tablet; Refill: 0  -     triamcinolone (KENALOG) 0.1 % paste; Apply  to teeth 2 (Two) Times a Day As Needed for Mucositis (mouth sores).  Dispense: 5 g; Refill: 1  -     fluocinonide (LIDEX) 0.05 % ointment; Apply 1 Application topically to the appropriate area as directed 2 (Two) Times a Day As Needed for Rash.  Dispense: 60 g; Refill: 1            Follow Up   No follow-ups on file.  Patient was given instructions and counseling regarding her condition or for health maintenance advice. Please see specific information pulled into the AVS if appropriate.     Jose Buck MD

## 2025-07-13 NOTE — TELEPHONE ENCOUNTER
Patient says she was admitted to [facility] about 2017 with Cota-Deo syndrome, a potentially life-threatening allergic reaction.  Please see if you can get a copy of the H&P and discharge summary from that for her files.

## 2025-07-14 NOTE — TELEPHONE ENCOUNTER
Sent secure chat to  to request the records from [facility] stat fax line at [phone]